# Patient Record
Sex: FEMALE
[De-identification: names, ages, dates, MRNs, and addresses within clinical notes are randomized per-mention and may not be internally consistent; named-entity substitution may affect disease eponyms.]

---

## 2018-05-02 PROBLEM — Z00.00 ENCOUNTER FOR PREVENTIVE HEALTH EXAMINATION: Status: ACTIVE | Noted: 2018-05-02

## 2018-06-01 ENCOUNTER — APPOINTMENT (OUTPATIENT)
Dept: INTERNAL MEDICINE | Facility: CLINIC | Age: 48
End: 2018-06-01

## 2018-09-10 ENCOUNTER — HOSPITAL ENCOUNTER (INPATIENT)
Dept: HOSPITAL 74 - YASAS | Age: 48
LOS: 6 days | Discharge: HOME | DRG: 773 | End: 2018-09-16
Attending: INTERNAL MEDICINE | Admitting: INTERNAL MEDICINE
Payer: COMMERCIAL

## 2018-09-10 VITALS — BODY MASS INDEX: 21.2 KG/M2

## 2018-09-10 DIAGNOSIS — I25.10: ICD-10-CM

## 2018-09-10 DIAGNOSIS — F19.24: ICD-10-CM

## 2018-09-10 DIAGNOSIS — Z86.19: ICD-10-CM

## 2018-09-10 DIAGNOSIS — I11.0: ICD-10-CM

## 2018-09-10 DIAGNOSIS — F41.9: ICD-10-CM

## 2018-09-10 DIAGNOSIS — J45.909: ICD-10-CM

## 2018-09-10 DIAGNOSIS — F12.20: ICD-10-CM

## 2018-09-10 DIAGNOSIS — F31.9: ICD-10-CM

## 2018-09-10 DIAGNOSIS — F25.0: ICD-10-CM

## 2018-09-10 DIAGNOSIS — F11.23: Primary | ICD-10-CM

## 2018-09-10 DIAGNOSIS — Z86.73: ICD-10-CM

## 2018-09-10 DIAGNOSIS — E78.00: ICD-10-CM

## 2018-09-10 DIAGNOSIS — B19.10: ICD-10-CM

## 2018-09-10 DIAGNOSIS — F17.213: ICD-10-CM

## 2018-09-10 DIAGNOSIS — Z88.8: ICD-10-CM

## 2018-09-10 PROCEDURE — HZ2ZZZZ DETOXIFICATION SERVICES FOR SUBSTANCE ABUSE TREATMENT: ICD-10-PCS | Performed by: INTERNAL MEDICINE
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
## 2018-09-11 RX ADMIN — Medication SCH MG: at 22:21

## 2018-09-11 RX ADMIN — ASPIRIN 81 MG SCH MG: 81 TABLET ORAL at 10:12

## 2018-09-11 RX ADMIN — Medication SCH TAB: at 10:13

## 2018-09-11 RX ADMIN — TOPIRAMATE SCH MG: 100 TABLET, FILM COATED ORAL at 22:20

## 2018-09-11 RX ADMIN — NICOTINE SCH MG: 14 PATCH, EXTENDED RELEASE TRANSDERMAL at 10:13

## 2018-09-11 RX ADMIN — ATORVASTATIN CALCIUM SCH MG: 40 TABLET, FILM COATED ORAL at 10:13

## 2018-09-11 RX ADMIN — ENALAPRIL MALEATE SCH MG: 5 TABLET ORAL at 10:13

## 2018-09-11 RX ADMIN — AMLODIPINE BESYLATE SCH MG: 10 TABLET ORAL at 10:15

## 2018-09-11 NOTE — CONSULT
[facility] Psychiatric Consult





- Data


Date of interview: 09/11/18


Admission source: [facility]


Identifying data: Patient is a 47 year old single female, without kids, 

unemployed, living in a shelter, and supported by [facility]. This is patient's first 

admission to detox at [facility]. Pt. admitted to  for opiate 

dependence.


Substance Abuse History: Smoking Cessation.  Smoking history: Current every day 

smoker.  Have you smoked in the past 12 months: Yes.  Aproximately how many 

cigarettes per day: 10.  Cigars Per Day: 0.  Hx Chewing Tobacco Use: No.  

Initiated information on smoking cessation: Yes.  'Breaking Loose' booklet given

: 09/10/18.  - Substance & Tx. History.  Hx Alcohol Use: No.  Hx Substance Use: 

Yes.  Substance Use Type: Heroin, Marijuana.  Hx Substance Use Treatment: Yes (

[facility] 2012).  - Substances Abused.  ** Heroin.  Route: Inhalation.  Frequency: 

Daily.  Amount used: 6 BAGS.  Age of first use: 21.  Date of Last Use: 09/10/

18.  ** Marijuana/Hashish.  Route: Smoking.  Frequency: 1-2 times per week.  

Amount used: 1 JOINT.  Age of first use: 16.  Date of Last Use: 09/09/18


Medical History: Asthma, h/o CHF, CVA (2003,2007), Hep C, ectopic pregnancy in 3

/2012


Psychiatric History: Patient reports one psychiatric hospitalization six  years 

ago at [facility] for depression. Current outpatient psychiatric services is 

provided at [address] [place] [address] in [facility]. Diagnosis of Bipolar disorder. As 

per pharmacy claims patient is prescribed latuda 120mg + topamax 100mg TID + 

Seroquel 100mg qhs + klonopin 0.5mg + clonodine 0.2BID. Pt. reports suboptimal 

adherence to medications. Reports most recently taking her medication one week 

ago. Patient denies h/o suicide attempt.


Physical/Sexual Abuse/Trauma History: denies.





Mental Status Exam





- Mental Status Exam


Alert and Oriented to: Time, Place, Person


Cognitive Function: Good


Patient Appearance: Well Groomed


Mood: Withdrawn, Euthymic


Affect: Mood Congruent


Patient Behavior: Fatigued, Cooperative


Speech Pattern: Appropriate


Voice Loudness: Moderately Soft/Quiet


Thought Process: Intact, Goal Oriented


Thought Disorder: Not Present


Hallucinations: Denies


Suicidal Ideation: Denies


Homicidal Ideation: Denies


Insight/Judgement: Poor


Sleep: Fair


Appetite: Fair


Muscle strength/Tone: Normal


Gait/Station: Normal





Psychiatric Findings





- Problem List (Axis 1, 2,3)


(1) Bipolar disorder


Current Visit: Yes   Status: Chronic   Comment: Reports sub- optimal adherence 

to medications.    





(2) Opioid dependence with withdrawal


Current Visit: Yes   Status: Acute   





(3) Cannabis dependence


Current Visit: Yes   Status: Acute   





(4) Substance induced mood disorder


Current Visit: Yes   Status: Acute   





- Initial Treatment Plan


Initial Treatment Plan: Psychoeducation provided. Detoxification in progress. 

Will order latuda 60mg qhs (reduced dosaged due to sub-optimal adherence)+ 

Topamax 100mg BID. Will hold seroquel tonight due to risk of oversedation. Pt. 

noted to be in bed throughout the day. Pt. agreeable with plan.

## 2018-09-11 NOTE — PN
[facility] COWS





- Scale


Resting Pulse: 0= [place] 80 or Below


Sweatin= Chills/Flushing


Restless Observation: 1= Difficult to Sit Still


Pupil Size: 1= Pupils >than Normal


Bone or Joint Aches: 2= Severe Diffuse Aches


Runny Nose/ Eye Tearin= Nasal Congestion


GI Upset > 30mins: 2= Nausea/Diarrhea


Tremor Observation of Outstretched Hands: 2= Slight Tremor Visible


Yawning Observation: 2= >3x During Session


Anxiety or Irritability: 2=Irritable/Anxious


Goose Flesh Skin: 0=Smooth Skin


COWS Score: 14





BHS Progress Note (SOAP)


Subjective: 





joint pain body ache muscle cramping trouble sleep at night sweat tremor anxiety


Objective: 





18 11:37


 Vital Signs











Temperature  98.1 F   18 09:31


 


Pulse Rate  69   18 09:31


 


Respiratory Rate  16   18 09:31


 


Blood Pressure  143/92   18 09:31


 


O2 Sat by Pulse Oximetry (%)      








lab no available 


Assessment: 





18 11:37


withdrawal sx


Plan: 





continue detox

## 2018-09-11 NOTE — EKG
Test Reason : 

Blood Pressure : ***/*** mmHG

Vent. Rate : 069 BPM     Atrial Rate : 069 BPM

   P-R Int : 094 ms          QRS Dur : 082 ms

    QT Int : 406 ms       P-R-T Axes : 000 050 019 degrees

   QTc Int : 435 ms

 

ECTOPIC ATRIAL RHYTHM WITH SHORT CT WITH OCCASIONAL PREMATURE

VENTRICULAR COMPLEXES

OTHERWISE NORMAL ECG

Confirmed by MD MEREDITH, WILFREDO ([id number]) on 9/11/2018 5:21:03 PM

 

Referred By:             Confirmed By:WILFREDO HARPER MD

## 2018-09-12 LAB
APPEARANCE UR: (no result)
BILIRUB UR STRIP.AUTO-MCNC: NEGATIVE MG/DL
COLOR UR: (no result)
KETONES UR QL STRIP: NEGATIVE
LEUKOCYTE ESTERASE UR QL STRIP.AUTO: NEGATIVE
NITRITE UR QL STRIP: NEGATIVE
PH UR: 7 [PH] (ref 5–8)
PROT UR QL STRIP: NEGATIVE
PROT UR QL STRIP: NEGATIVE
SP GR UR: 1.02 (ref 1–1.03)
UROBILINOGEN UR STRIP-MCNC: 2 MG/DL (ref 0.2–1)

## 2018-09-12 RX ADMIN — ENALAPRIL MALEATE SCH: 5 TABLET ORAL at 10:36

## 2018-09-12 RX ADMIN — TOPIRAMATE SCH MG: 100 TABLET, FILM COATED ORAL at 10:32

## 2018-09-12 RX ADMIN — NICOTINE POLACRILEX PRN MG: 2 GUM, CHEWING ORAL at 10:35

## 2018-09-12 RX ADMIN — Medication SCH TAB: at 10:32

## 2018-09-12 RX ADMIN — TOPIRAMATE SCH MG: 100 TABLET, FILM COATED ORAL at 13:26

## 2018-09-12 RX ADMIN — AMLODIPINE BESYLATE SCH: 10 TABLET ORAL at 10:36

## 2018-09-12 RX ADMIN — NICOTINE SCH MG: 14 PATCH, EXTENDED RELEASE TRANSDERMAL at 10:35

## 2018-09-12 RX ADMIN — ASPIRIN 81 MG SCH MG: 81 TABLET ORAL at 10:32

## 2018-09-12 RX ADMIN — ATORVASTATIN CALCIUM SCH MG: 40 TABLET, FILM COATED ORAL at 10:32

## 2018-09-12 RX ADMIN — LURASIDONE HYDROCHLORIDE SCH MG: 40 TABLET, FILM COATED ORAL at 17:30

## 2018-09-12 RX ADMIN — TOPIRAMATE SCH MG: 100 TABLET, FILM COATED ORAL at 22:31

## 2018-09-12 RX ADMIN — CLOPIDOGREL BISULFATE SCH MG: 75 TABLET, FILM COATED ORAL at 11:11

## 2018-09-12 RX ADMIN — Medication SCH MG: at 22:31

## 2018-09-12 NOTE — PN
[facility] COWS





- Scale


Resting Pulse: 0= [place] 80 or Below


Sweatin= Chills/Flushing


Restless Observation: 1= Difficult to Sit Still


Pupil Size: 1= Pupils >than Normal


Bone or Joint Aches: 2= Severe Diffuse Aches


Runny Nose/ Eye Tearin= Nasal Congestion


GI Upset > 30mins: 2= Nausea/Diarrhea


Tremor Observation of Outstretched Hands: 2= Slight Tremor Visible


Yawning Observation: 1= 1-2x During Session


Anxiety or Irritability: 2=Irritable/Anxious


Goose Flesh Skin: 0=Smooth Skin


COWS Score: 13





BHS Progress Note (SOAP)


Subjective: 





reported has panic attack on and off now since 8 am


requests klonopin that was what the er gave to her


patient had 80% breakfast ambulate on [address] social with peers and staff


body ache joints pain sweat tremor skin warm able to complete whole sentence


Objective: 





18 10:56


 Vital Signs











Temperature  97.9 F   18 09:21


 


Pulse Rate  60   18 09:21


 


Respiratory Rate  18   18 09:21


 


Blood Pressure  100/66   18 09:21


 


O2 Sat by Pulse Oximetry (%)      








lab not available


18 10:58


reorder admission lab set


Assessment: 





18 10:59


withdrawal sx


Plan: 





continue detox
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
## 2018-09-13 LAB
ALBUMIN SERPL-MCNC: 3.3 G/DL (ref 3.4–5)
ALP SERPL-CCNC: 113 U/L (ref 45–117)
ALT SERPL-CCNC: 89 U/L (ref 13–61)
ANION GAP SERPL CALC-SCNC: 9 MMOL/L (ref 8–16)
AST SERPL-CCNC: 58 U/L (ref 15–37)
BILIRUB SERPL-MCNC: 0.2 MG/DL (ref 0.2–1)
BUN SERPL-MCNC: 19 MG/DL (ref 7–18)
CALCIUM SERPL-MCNC: 8.4 MG/DL (ref 8.5–10.1)
CHLORIDE SERPL-SCNC: 113 MMOL/L (ref 98–107)
CO2 SERPL-SCNC: 22 MMOL/L (ref 21–32)
CREAT SERPL-MCNC: 0.9 MG/DL (ref 0.55–1.02)
DEPRECATED RDW RBC AUTO: 14.2 % (ref 11.6–15.6)
GLUCOSE SERPL-MCNC: 86 MG/DL (ref 74–106)
HCT VFR BLD CALC: 40.4 % (ref 32.4–45.2)
HGB BLD-MCNC: 13.4 GM/DL (ref 10.7–15.3)
MCH RBC QN AUTO: 32.1 PG (ref 25.7–33.7)
MCHC RBC AUTO-ENTMCNC: 33.1 G/DL (ref 32–36)
MCV RBC: 97.2 FL (ref 80–96)
PLATELET # BLD AUTO: 148 K/MM3 (ref 134–434)
PMV BLD: 10.2 FL (ref 7.5–11.1)
POTASSIUM SERPLBLD-SCNC: 4.4 MMOL/L (ref 3.5–5.1)
PROT SERPL-MCNC: 6.3 G/DL (ref 6.4–8.2)
RBC # BLD AUTO: 4.16 M/MM3 (ref 3.6–5.2)
SODIUM SERPL-SCNC: 144 MMOL/L (ref 136–145)
WBC # BLD AUTO: 5.2 K/MM3 (ref 4–10)

## 2018-09-13 RX ADMIN — PANTOPRAZOLE SODIUM SCH MG: 20 TABLET, DELAYED RELEASE ORAL at 17:57

## 2018-09-13 RX ADMIN — AMLODIPINE BESYLATE SCH: 10 TABLET ORAL at 11:22

## 2018-09-13 RX ADMIN — TOPIRAMATE SCH MG: 100 TABLET, FILM COATED ORAL at 22:13

## 2018-09-13 RX ADMIN — NICOTINE SCH MG: 14 PATCH, EXTENDED RELEASE TRANSDERMAL at 11:23

## 2018-09-13 RX ADMIN — LURASIDONE HYDROCHLORIDE SCH MG: 40 TABLET, FILM COATED ORAL at 17:12

## 2018-09-13 RX ADMIN — Medication SCH TAB: at 11:23

## 2018-09-13 RX ADMIN — HYDROXYZINE PAMOATE PRN MG: 50 CAPSULE ORAL at 16:45

## 2018-09-13 RX ADMIN — TOPIRAMATE SCH MG: 100 TABLET, FILM COATED ORAL at 14:16

## 2018-09-13 RX ADMIN — ENALAPRIL MALEATE SCH MG: 5 TABLET ORAL at 11:22

## 2018-09-13 RX ADMIN — Medication PRN MG: at 22:14

## 2018-09-13 RX ADMIN — TOPIRAMATE SCH MG: 100 TABLET, FILM COATED ORAL at 05:50

## 2018-09-13 RX ADMIN — CLOPIDOGREL BISULFATE SCH MG: 75 TABLET, FILM COATED ORAL at 11:22

## 2018-09-13 RX ADMIN — HYDROXYZINE PAMOATE PRN MG: 50 CAPSULE ORAL at 22:13

## 2018-09-13 RX ADMIN — Medication SCH MG: at 22:13

## 2018-09-13 RX ADMIN — ASPIRIN 81 MG SCH MG: 81 TABLET ORAL at 11:22

## 2018-09-13 RX ADMIN — ATORVASTATIN CALCIUM SCH MG: 40 TABLET, FILM COATED ORAL at 11:22

## 2018-09-13 NOTE — PN
[facility] Progress Note (SOAP)


Subjective: 





anxiety wants klonopin "I take that every day"


discuss risks of benzo mixed with opiate


body pain joints pain sweat tremor trouble sleep at night


Objective: 





09/13/18 13:59


 Vital Signs











Temperature  98.1 F   09/13/18 09:12


 


Pulse Rate  73   09/13/18 09:12


 


Respiratory Rate  18   09/13/18 09:12


 


Blood Pressure  121/65   09/13/18 09:12


 


O2 Sat by Pulse Oximetry (%)      








 Laboratory Last Values











WBC  5.2 K/mm3 (4.0-10.0)   09/13/18  07:00    


 


RBC  4.16 M/mm3 (3.60-5.2)   09/13/18  07:00    


 


Hgb  13.4 GM/dL (10.7-15.3)   09/13/18  07:00    


 


Hct  40.4 % (32.4-45.2)   09/13/18  07:00    


 


MCV  97.2 fl (80-96)  H  09/13/18  07:00    


 


MCH  32.1 pg (25.7-33.7)   09/13/18  07:00    


 


MCHC  33.1 g/dl (32.0-36.0)   09/13/18  07:00    


 


RDW  14.2 % (11.6-15.6)   09/13/18  07:00    


 


Plt Count  148 K/MM3 (134-434)   09/13/18  07:00    


 


MPV  10.2 fl (7.5-11.1)   09/13/18  07:00    


 


Sodium  144 mmol/L (136-145)   09/13/18  07:00    


 


Potassium  4.4 mmol/L (3.5-5.1)   09/13/18  07:00    


 


Chloride  113 mmol/L ()  H  09/13/18  07:00    


 


Carbon Dioxide  22 mmol/L (21-32)   09/13/18  07:00    


 


Anion Gap  9 MMOL/L (8-16)   09/13/18  07:00    


 


BUN  19 mg/dL (7-18)  H  09/13/18  07:00    


 


Creatinine  0.9 mg/dL (0.55-1.02)   09/13/18  07:00    


 


Creat Clearance w eGFR  > 60  (>60)   09/13/18  07:00    


 


Random Glucose  86 mg/dL ()   09/13/18  07:00    


 


Calcium  8.4 mg/dL (8.5-10.1)  L  09/13/18  07:00    


 


Total Bilirubin  0.2 mg/dL (0.2-1.0)   09/13/18  07:00    


 


AST  58 U/L (15-37)  H  09/13/18  07:00    


 


ALT  89 U/L (13-61)  H  09/13/18  07:00    


 


Alkaline Phosphatase  113 U/L ()   09/13/18  07:00    


 


Total Protein  6.3 g/dl (6.4-8.2)  L  09/13/18  07:00    


 


Albumin  3.3 g/dl (3.4-5.0)  L  09/13/18  07:00    


 


Urine Color  Claire   09/12/18  16:45    


 


Urine Appearance  Cloudy   09/12/18  16:45    


 


Urine pH  7.0  (5.0-8.0)   09/12/18  16:45    


 


Ur Specific Gravity  1.023  (1.001-1.035)   09/12/18  16:45    


 


Urine Protein  Negative  (NEGATIVE)   09/12/18  16:45    


 


Urine Glucose (UA)  Negative  (NEGATIVE)   09/12/18  16:45    


 


Urine Ketones  Negative  (NEGATIVE)   09/12/18  16:45    


 


Urine Blood  Negative  (NEGATIVE)   09/12/18  16:45    


 


Urine Nitrite  Negative  (NEGATIVE)   09/12/18  16:45    


 


Urine Bilirubin  Negative  (<2.0 mg/dL)   09/12/18  16:45    


 


Urine Urobilinogen  2.0 mg/dL (0.2-1.0)  H  09/12/18  16:45    


 


Ur Leukocyte Esterase  Negative  (NEGATIVE)   09/12/18  16:45    


 


RPR Titer  Nonreactive  (NONREACTIVE)   09/13/18  07:00    








lab noted


Assessment: 





09/13/18 13:59


withdrawal sx


Plan: 





continue detox

## 2018-09-14 RX ADMIN — NICOTINE POLACRILEX PRN MG: 2 GUM, CHEWING ORAL at 17:24

## 2018-09-14 RX ADMIN — NICOTINE SCH MG: 14 PATCH, EXTENDED RELEASE TRANSDERMAL at 10:36

## 2018-09-14 RX ADMIN — Medication PRN MG: at 22:41

## 2018-09-14 RX ADMIN — ASPIRIN 81 MG SCH MG: 81 TABLET ORAL at 10:34

## 2018-09-14 RX ADMIN — Medication SCH TAB: at 10:34

## 2018-09-14 RX ADMIN — AMLODIPINE BESYLATE SCH: 10 TABLET ORAL at 10:34

## 2018-09-14 RX ADMIN — TOPIRAMATE SCH MG: 100 TABLET, FILM COATED ORAL at 22:40

## 2018-09-14 RX ADMIN — ENALAPRIL MALEATE SCH: 5 TABLET ORAL at 10:34

## 2018-09-14 RX ADMIN — HYDROXYZINE PAMOATE PRN MG: 50 CAPSULE ORAL at 03:08

## 2018-09-14 RX ADMIN — Medication SCH MG: at 22:40

## 2018-09-14 RX ADMIN — TOPIRAMATE SCH MG: 100 TABLET, FILM COATED ORAL at 07:09

## 2018-09-14 RX ADMIN — PANTOPRAZOLE SODIUM SCH MG: 20 TABLET, DELAYED RELEASE ORAL at 10:34

## 2018-09-14 RX ADMIN — CLOPIDOGREL BISULFATE SCH MG: 75 TABLET, FILM COATED ORAL at 10:34

## 2018-09-14 RX ADMIN — LURASIDONE HYDROCHLORIDE SCH MG: 40 TABLET, FILM COATED ORAL at 17:18

## 2018-09-14 RX ADMIN — HYDROXYZINE PAMOATE PRN MG: 50 CAPSULE ORAL at 22:41

## 2018-09-14 RX ADMIN — TOPIRAMATE SCH MG: 100 TABLET, FILM COATED ORAL at 15:00

## 2018-09-14 RX ADMIN — ATORVASTATIN CALCIUM SCH MG: 40 TABLET, FILM COATED ORAL at 10:34

## 2018-09-14 RX ADMIN — HYDROXYZINE PAMOATE PRN MG: 50 CAPSULE ORAL at 17:32

## 2018-09-14 NOTE — PN
[facility] Progress Note (SOAP)


Subjective: 





sweats


shakes


interrupted sleep


ambulating





Objective: 





09/14/18 11:06


 Vital Signs











Temperature  98.1 F   09/14/18 10:04


 


Pulse Rate  69   09/14/18 10:04


 


Respiratory Rate  20   09/14/18 10:04


 


Blood Pressure  92/62   09/14/18 10:04


 


O2 Sat by Pulse Oximetry (%)      








aaox3


ambulating


no acute distress


Assessment: 





09/14/18 11:06


withdrawal sx


Plan: 





continue detox


increase fluids

## 2018-09-15 RX ADMIN — NICOTINE SCH MG: 14 PATCH, EXTENDED RELEASE TRANSDERMAL at 10:35

## 2018-09-15 RX ADMIN — LURASIDONE HYDROCHLORIDE SCH MG: 40 TABLET, FILM COATED ORAL at 17:23

## 2018-09-15 RX ADMIN — TOPIRAMATE SCH: 100 TABLET, FILM COATED ORAL at 07:00

## 2018-09-15 RX ADMIN — ASPIRIN 81 MG SCH MG: 81 TABLET ORAL at 10:34

## 2018-09-15 RX ADMIN — TOPIRAMATE SCH MG: 100 TABLET, FILM COATED ORAL at 13:43

## 2018-09-15 RX ADMIN — ATORVASTATIN CALCIUM SCH MG: 40 TABLET, FILM COATED ORAL at 10:35

## 2018-09-15 RX ADMIN — HYDROXYZINE PAMOATE PRN MG: 50 CAPSULE ORAL at 17:33

## 2018-09-15 RX ADMIN — ENALAPRIL MALEATE SCH: 5 TABLET ORAL at 10:35

## 2018-09-15 RX ADMIN — CLOPIDOGREL BISULFATE SCH MG: 75 TABLET, FILM COATED ORAL at 10:34

## 2018-09-15 RX ADMIN — Medication SCH MG: at 22:25

## 2018-09-15 RX ADMIN — PANTOPRAZOLE SODIUM SCH MG: 20 TABLET, DELAYED RELEASE ORAL at 10:34

## 2018-09-15 RX ADMIN — AMLODIPINE BESYLATE SCH: 10 TABLET ORAL at 10:35

## 2018-09-15 RX ADMIN — Medication SCH TAB: at 10:34

## 2018-09-15 RX ADMIN — TOPIRAMATE SCH MG: 100 TABLET, FILM COATED ORAL at 22:25

## 2018-09-15 RX ADMIN — HYDROXYZINE PAMOATE PRN MG: 50 CAPSULE ORAL at 12:05

## 2018-09-15 RX ADMIN — HYDROXYZINE PAMOATE PRN MG: 50 CAPSULE ORAL at 22:43

## 2018-09-15 NOTE — PN
[facility] Progress Note (SOAP)


Subjective: 





pt states she is feeling a lot of anxiety- was on clonidine and klonopin for 

anxiety- not getting these here. 





O:


 Vital Signs - 24 hr











  09/14/18 09/14/18 09/14/18





  14:00 18:23 22:33


 


Temperature 97.1 F L 97.5 F L 97.6 F


 


Pulse Rate 81 77 61


 


Respiratory 16 19 16





Rate   


 


Blood Pressure 129/66 101/70 105/64














  09/15/18 09/15/18 09/15/18





  00:30 07:56 08:59


 


Temperature   98.2 F


 


Pulse Rate   68


 


Respiratory 18 18 16





Rate   


 


Blood Pressure   95/66








 Laboratory Tests











  09/12/18 09/13/18 09/13/18





  16:45 07:00 07:00


 


WBC   5.2 


 


RBC   4.16 


 


Hgb   13.4 


 


Hct   40.4 


 


MCV   97.2 H 


 


MCH   32.1 


 


MCHC   33.1 


 


RDW   14.2 


 


Plt Count   148 


 


MPV   10.2 


 


Sodium    144


 


Potassium    4.4


 


Chloride    113 H


 


Carbon Dioxide    22


 


Anion Gap    9


 


BUN    19 H


 


Creatinine    0.9


 


Creat Clearance w eGFR    > 60


 


Random Glucose    86


 


Calcium    8.4 L


 


Total Bilirubin    0.2


 


AST    58 H


 


ALT    89 H


 


Alkaline Phosphatase    113


 


Total Protein    6.3 L


 


Albumin    3.3 L


 


Urine Color  Claire  


 


Urine Appearance  Cloudy  


 


Urine pH  7.0  


 


Ur Specific Gravity  1.023  


 


Urine Protein  Negative  


 


Urine Glucose (UA)  Negative  


 


Urine Ketones  Negative  


 


Urine Blood  Negative  


 


Urine Nitrite  Negative  


 


Urine Bilirubin  Negative  


 


Urine Urobilinogen  2.0 H  


 


Ur Leukocyte Esterase  Negative  


 


RPR Titer   














  09/13/18





  07:00


 


WBC 


 


RBC 


 


Hgb 


 


Hct 


 


MCV 


 


MCH 


 


MCHC 


 


RDW 


 


Plt Count 


 


MPV 


 


Sodium 


 


Potassium 


 


Chloride 


 


Carbon Dioxide 


 


Anion Gap 


 


BUN 


 


Creatinine 


 


Creat Clearance w eGFR 


 


Random Glucose 


 


Calcium 


 


Total Bilirubin 


 


AST 


 


ALT 


 


Alkaline Phosphatase 


 


Total Protein 


 


Albumin 


 


Urine Color 


 


Urine Appearance 


 


Urine pH 


 


Ur Specific Gravity 


 


Urine Protein 


 


Urine Glucose (UA) 


 


Urine Ketones 


 


Urine Blood 


 


Urine Nitrite 


 


Urine Bilirubin 


 


Urine Urobilinogen 


 


Ur Leukocyte Esterase 


 


RPR Titer  Nonreactive








mildly increased liver enzymes


decreased BP- repeat sBP ~120





a/p: continue detox protocol- about completed.


vistaril given for anxiety- pt states not helping, will give clonidine 0.1mg (

takes 0.2mg at home) BP OK


Psych consult for anxiety

## 2018-09-16 VITALS — HEART RATE: 80 BPM | TEMPERATURE: 97.7 F | SYSTOLIC BLOOD PRESSURE: 103 MMHG | DIASTOLIC BLOOD PRESSURE: 68 MMHG

## 2018-09-16 RX ADMIN — PANTOPRAZOLE SODIUM SCH MG: 20 TABLET, DELAYED RELEASE ORAL at 09:56

## 2018-09-16 RX ADMIN — Medication SCH TAB: at 09:56

## 2018-09-16 RX ADMIN — ENALAPRIL MALEATE SCH MG: 5 TABLET ORAL at 09:57

## 2018-09-16 RX ADMIN — CLOPIDOGREL BISULFATE SCH MG: 75 TABLET, FILM COATED ORAL at 09:56

## 2018-09-16 RX ADMIN — ASPIRIN 81 MG SCH MG: 81 TABLET ORAL at 09:56

## 2018-09-16 RX ADMIN — AMLODIPINE BESYLATE SCH: 10 TABLET ORAL at 09:56

## 2018-09-16 RX ADMIN — NICOTINE SCH: 14 PATCH, EXTENDED RELEASE TRANSDERMAL at 09:56

## 2018-09-16 RX ADMIN — ATORVASTATIN CALCIUM SCH MG: 40 TABLET, FILM COATED ORAL at 09:56

## 2018-09-16 RX ADMIN — TOPIRAMATE SCH MG: 100 TABLET, FILM COATED ORAL at 06:10

## 2018-09-16 NOTE — DS
[facility] Detox Discharge Summary


Admission Date: 


09/10/18





Discharge Date: 09/16/18





- History


Present History: Opioid Dependence


Additional Comments: 





47 years old female admitted on 9/10/18 for opiate withdrawal sx


completed detox regimen tolerated well denies opiate withdrawal sx


alert oriented x 3 no acute distress [facility]





- Physical Exam Results


Vital Signs: 


 Vital Signs











Temperature  96.4 F L  09/15/18 22:42


 


Pulse Rate  69   09/16/18 07:42


 


Respiratory Rate  18   09/16/18 07:42


 


Blood Pressure  96/59   09/16/18 07:42


 


O2 Sat by Pulse Oximetry (%)      











Pertinent Admission Physical Exam Findings: 





opiate withdrawal sx


 Vital Signs











Temperature  97.7 F   09/16/18 09:38


 


Pulse Rate  80   09/16/18 09:38


 


Respiratory Rate  18   09/16/18 09:38


 


Blood Pressure  103/68   09/16/18 09:38


 


O2 Sat by Pulse Oximetry (%)      








 Laboratory Last Values











WBC  5.2 K/mm3 (4.0-10.0)   09/13/18  07:00    


 


RBC  4.16 M/mm3 (3.60-5.2)   09/13/18  07:00    


 


Hgb  13.4 GM/dL (10.7-15.3)   09/13/18  07:00    


 


Hct  40.4 % (32.4-45.2)   09/13/18  07:00    


 


MCV  97.2 fl (80-96)  H  09/13/18  07:00    


 


MCH  32.1 pg (25.7-33.7)   09/13/18  07:00    


 


MCHC  33.1 g/dl (32.0-36.0)   09/13/18  07:00    


 


RDW  14.2 % (11.6-15.6)   09/13/18  07:00    


 


Plt Count  148 K/MM3 (134-434)   09/13/18  07:00    


 


MPV  10.2 fl (7.5-11.1)   09/13/18  07:00    


 


Sodium  144 mmol/L (136-145)   09/13/18  07:00    


 


Potassium  4.4 mmol/L (3.5-5.1)   09/13/18  07:00    


 


Chloride  113 mmol/L ()  H  09/13/18  07:00    


 


Carbon Dioxide  22 mmol/L (21-32)   09/13/18  07:00    


 


Anion Gap  9 MMOL/L (8-16)   09/13/18  07:00    


 


BUN  19 mg/dL (7-18)  H  09/13/18  07:00    


 


Creatinine  0.9 mg/dL (0.55-1.02)   09/13/18  07:00    


 


Creat Clearance w eGFR  > 60  (>60)   09/13/18  07:00    


 


Random Glucose  86 mg/dL ()   09/13/18  07:00    


 


Calcium  8.4 mg/dL (8.5-10.1)  L  09/13/18  07:00    


 


Total Bilirubin  0.2 mg/dL (0.2-1.0)   09/13/18  07:00    


 


AST  58 U/L (15-37)  H  09/13/18  07:00    


 


ALT  89 U/L (13-61)  H  09/13/18  07:00    


 


Alkaline Phosphatase  113 U/L ()   09/13/18  07:00    


 


Total Protein  6.3 g/dl (6.4-8.2)  L  09/13/18  07:00    


 


Albumin  3.3 g/dl (3.4-5.0)  L  09/13/18  07:00    


 


Urine Color  Claire   09/12/18  16:45    


 


Urine Appearance  Cloudy   09/12/18  16:45    


 


Urine pH  7.0  (5.0-8.0)   09/12/18  16:45    


 


Ur Specific Gravity  1.023  (1.001-1.035)   09/12/18  16:45    


 


Urine Protein  Negative  (NEGATIVE)   09/12/18  16:45    


 


Urine Glucose (UA)  Negative  (NEGATIVE)   09/12/18  16:45    


 


Urine Ketones  Negative  (NEGATIVE)   09/12/18  16:45    


 


Urine Blood  Negative  (NEGATIVE)   09/12/18  16:45    


 


Urine Nitrite  Negative  (NEGATIVE)   09/12/18  16:45    


 


Urine Bilirubin  Negative  (<2.0 mg/dL)   09/12/18  16:45    


 


Urine Urobilinogen  2.0 mg/dL (0.2-1.0)  H  09/12/18  16:45    


 


Ur Leukocyte Esterase  Negative  (NEGATIVE)   09/12/18  16:45    


 


RPR Titer  Nonreactive  (NONREACTIVE)   09/13/18  07:00    








lab noted





- Treatment


Hospital Course: Detox Protocol Followed, Detoxed Safely, Responded well, 

Discharged Condition Good, Rehab Referral Accepted


Patient has Accepted a Rehab Referral to: [facility]





- Medication


Discharge Medications: 


Ambulatory Orders





Aspirin [ASA -] 81 mg PO DAILY 09/10/18 


Bisacodyl [Bisacodyl -] 5 mg PO DAILY 09/10/18 


Docusate Sodium [Colace -] 100 mg PO DAILY 09/10/18 


Famotidine [Heartburn Prevention] 20 mg PO DAILY 09/10/18 


Hydroxyzine HCl 50 mg PO DAILY 09/10/18 


Loratadine [Claritin -] 10 mg PO DAILY 09/10/18 


Tenofovir Alafenamide Fumarate [Vemlidy] 25 mg PO DAILY 09/10/18 


Lurasidone HCl [Latuda] 60 mg PO HS 09/11/18 


Topiramate [Topamax -] 100 mg PO BID 09/11/18 


Quetiapine Fumarate [Seroquel] 50 mg PO HS 09/12/18 


Amlodipine Besylate [Norvasc -] 10 mg PO DAILY #30 tablet 09/16/18 


Atorvastatin Ca [Lipitor] 40 mg PO DAILY #30 tablet 09/16/18 


Clopidogrel Bisulfate [Plavix -] 75 mg PO DAILY #30 tablet 09/16/18 


Enalapril Maleate [Vasotec -] 5 mg PO DAILY #30 tablet 09/16/18 


Gabapentin [Neurontin -] 800 mg PO DAILY #30 capsule 09/16/18 


Metoprolol Succinate [Toprol XL -] 25 mg PO DAILY #30 tab.sr.24h 09/16/18 











- Diagnosis


(1) Opioid dependence with withdrawal


Status: Acute   





(2) Asthma


Status: Chronic   


Qualifiers: 


   Asthma severity: mild   Asthma persistence: intermittent   Asthma 

complication type: with status asthmaticus   Qualified Code(s): J45.22 - Mild 

intermittent asthma with status asthmaticus   





(3) HTN (hypertension)


Status: Chronic   


Qualifiers: 


   Hypertension type: essential hypertension   Qualified Code(s): I10 - 

Essential (primary) hypertension   





(4) Heart failure


Status: Chronic   


Qualifiers: 


   Heart failure type: unspecified   Heart failure chronicity: unspecified   

Qualified Code(s): I50.9 - Heart failure, unspecified   





(5) Hep B w/o coma


Status: Chronic   





(6) Hyperlipidemia


Status: Chronic   


Qualifiers: 


   Hyperlipidemia type: pure hypercholesterolemia   Qualified Code(s): E78.00 - 

Pure hypercholesterolemia, unspecified; E78.0 - Pure hypercholesterolemia   





(7) Nicotine dependence


Status: Acute   


Qualifiers: 


   Nicotine product type: cigarettes   Substance use status: in withdrawal   

Qualified Code(s): F17.213 - Nicotine dependence, cigarettes, with withdrawal   





(8) Schizoaffective disorder


Status: Suspected   


Qualifiers: 


   Schizoaffective disorder type: bipolar   Qualified Code(s): F25.0 - 

Schizoaffective disorder, bipolar type   





- AMA


Did Patient Leave Against Medical Advice: No
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
## 2019-02-12 NOTE — PN
[facility] CIWA





- CIWA Score


Nausea/Vomitin-Mild Nausea/No Vomiting


Muscle Tremors: 3


Anxiety: 3


Agitation: 3


Paroxysmal Sweats: 1-Minimal Palms Moist


Orientation: 1-Uncertain about Date


Tacttile Disturbances: 1-Very Mild Itch/Numbness


Auditory Disturbances: 0-None


Visual Disturbances: 0-None


Headache: 1-Very Mild


CIWA-Ar Total Score: 14





[facility] COWS





- Scale


Resting Pulse: 0= MD 80 or Below


Sweatin= Chills/Flushing


Restless Observation: 1= Difficult to Sit Still


Pupil Size: 0= Normal to Room Light


Bone or Joint Aches: 2= Severe Diffuse Aches


Runny Nose/ Eye Tearin= Nasal Congestion


GI Upset > 30mins: 1= Stomach Cramp


Tremor Observation of Outstretched Hands: 1= Tremor Felt, Not Seen


Yawning Observation: 1= 1-2x During Session


Anxiety or Irritability: 2=Irritable/Anxious


Goose Flesh Skin: 0=Smooth Skin


COWS Score: 10





BHS Progress Note (SOAP)


Subjective: 





body aches tremor anxiety restlessness


 die x 3 years sober on and off throughout the years


tearful determines to care for self and maintain sober





Objective: 





19 11:55


 Vital Signs











Temperature  96.7 F L  19 09:13


 


Pulse Rate  68   19 09:13


 


Respiratory Rate  16   19 09:13


 


Blood Pressure  103/72   19 09:13


 


O2 Sat by Pulse Oximetry (%)      








 Laboratory Last Values











WBC  9.0 K/mm3 (4.0-10.0)   19  08:10    


 


RBC  4.40 M/mm3 (3.60-5.2)   19  08:10    


 


Hgb  14.6 GM/dL (10.7-15.3)   19  08:10    


 


Hct  42.2 % (32.4-45.2)   19  08:10    


 


MCV  96.0 fl (80-96)   19  08:10    


 


MCH  33.2 pg (25.7-33.7)   19  08:10    


 


MCHC  34.6 g/dl (32.0-36.0)   19  08:10    


 


RDW  15.2 % (11.6-15.6)   19  08:10    


 


Plt Count  187 K/MM3 (134-434)  D 19  08:10    


 


MPV  9.5 fl (7.5-11.1)   19  08:10    


 


Sodium  142 mmol/L (136-145)   19  08:10    


 


Potassium  3.5 mmol/L (3.5-5.1)   19  08:10    


 


Chloride  106 mmol/L ()   19  08:10    


 


Carbon Dioxide  28 mmol/L (21-32)   19  08:10    


 


Anion Gap  7 MMOL/L (8-16)  L  19  08:10    


 


BUN  9 mg/dL (7-18)   19  08:10    


 


Creatinine  0.8 mg/dL (0.55-1.3)   19  08:10    


 


Creat Clearance w eGFR  > 60  (>60)   19  08:10    


 


Random Glucose  101 mg/dL ()   19  08:10    


 


Calcium  8.5 mg/dL (8.5-10.1)   19  08:10    


 


Total Bilirubin  0.6 mg/dL (0.2-1)   19  08:10    


 


AST  46 U/L (15-37)  H  19  08:10    


 


ALT  54 U/L (13-61)   19  08:10    


 


Alkaline Phosphatase  114 U/L ()   19  08:10    


 


Total Protein  6.8 g/dl (6.4-8.2)   19  08:10    


 


Albumin  3.7 g/dl (3.4-5.0)   19  08:10    


 


Urine Color  Claire   19  22:15    


 


Urine Appearance  Clear   19  22:15    


 


Urine pH  6.0  (5.0-8.0)   19  22:15    


 


Ur Specific Gravity  1.023  (1.010-1.035)   19  22:15    


 


Urine Protein  Negative  (NEGATIVE)   19  22:15    


 


Urine Glucose (UA)  Negative  (NEGATIVE)   19  22:15    


 


Urine Ketones  Negative  (NEGATIVE)   19  22:15    


 


Urine Blood  Negative  (NEGATIVE)   19  22:15    


 


Urine Nitrite  Negative  (NEGATIVE)   19  22:15    


 


Urine Bilirubin  Negative  (<2.0 mg/dL)   19  22:15    


 


Urine Urobilinogen  4.0 e.u/dl mg/dL (0.2-1.0)  H  19  22:15    


 


Ur Leukocyte Esterase  Negative  (NEGATIVE)   19  22:15    








lab noted


increase enalapril to 5 mg bid


Assessment: 





19 11:56


withdrawal sx


denies suicidal ideation that  suicided 


patient is angry but good self control verbalizes feelings and coping machines


Plan: 





continue detox

## 2019-02-12 NOTE — PN
[facility] Progress Note


Note: 





Psychiatry


Attending's note (delayed) : 


Called by nurse.


Issue : patient requests seroquel at bedtime. 


Chart reviewed. Normal vitals. No sedation reported.


Patient is ambulatory all day.Steady gait. Anxious.


Intervention : seroquel 100 mg po hs. Ordered.


Will follow and titrate as clinically indicated.

## 2019-02-13 NOTE — PN
[facility] Progress Note


Note: 





Received Notice from RN that Patient Reports that Immdoium has not been 

effective for treatment of Diarrhea. Will change to Lomotil PO PRN for 

treatment of diarrhea going forward. WILLIAM Johnson NP

## 2019-02-13 NOTE — PN
S CIWA





- CIWA Score


Nausea/Vomiting: 3


Muscle Tremors: 2


Anxiety: 4-Mod. Anxious/Guarded


Agitation: 3


Paroxysmal Sweats: 2


Orientation: 2-Disoriented Date<2 days


Tacttile Disturbances: 1-Very Mild Itch/Numbness


Auditory Disturbances: 0-None


Visual Disturbances: 1-Very Mild Sensitivity


Headache: 0-None Present


CIWA-Ar Total Score: 18





[facility] COWS





- Scale


Resting Pulse: 1= [place] 


Sweatin= Chills/Flushing


Restless Observation: 1= Difficult to Sit Still


Pupil Size: 0= Normal to Room Light


Bone or Joint Aches: 2= Severe Diffuse Aches


Runny Nose/ Eye Tearin= None


GI Upset > 30mins: 2= Nausea/Diarrhea


Tremor Observation of Outstretched Hands: 2= Slight Tremor Visible


Yawning Observation: 1= 1-2x During Session


Anxiety or Irritability: 4=Extreme Anxiety


Goose Flesh Skin: 0=Smooth Skin


COWS Score: 14





BHS Progress Note (SOAP)


Subjective: 





Interrupted Sleep, H/A, Nausea, Tremors, Sweating, Diarrhea, Body Aches, Hot / 

Cold Sensations.


Objective: 


PATIENT A & O X 2 (UNCERTAIN ABOUT CURRENT DAY / DATE). PATIENT OBSERVED 

AMBULATING ON UNIT. IN NO ACUTE DISTRESS.





19 14:43


 Vital Signs











Temperature  98.3 F   19 13:36


 


Pulse Rate  82   19 13:36


 


Respiratory Rate  18   19 13:36


 


Blood Pressure  147/110 H  19 13:36


 


O2 Sat by Pulse Oximetry (%)      








 Laboratory Tests











  19





  22:15 08:10 08:10


 


WBC   9.0 


 


RBC   4.40 


 


Hgb   14.6 


 


Hct   42.2 


 


MCV   96.0 


 


MCH   33.2 


 


MCHC   34.6 


 


RDW   15.2 


 


Plt Count   187  D 


 


MPV   9.5 


 


Sodium    142


 


Potassium    3.5


 


Chloride    106


 


Carbon Dioxide    28


 


Anion Gap    7 L


 


BUN    9


 


Creatinine    0.8


 


Creat Clearance w eGFR    > 60


 


Random Glucose    101


 


Calcium    8.5


 


Total Bilirubin    0.6


 


AST    46 H


 


ALT    54


 


Alkaline Phosphatase    114


 


Total Protein    6.8


 


Albumin    3.7


 


Urine Color  Claire  


 


Urine Appearance  Clear  


 


Urine pH  6.0  


 


Ur Specific Gravity  1.023  


 


Urine Protein  Negative  


 


Urine Glucose (UA)  Negative  


 


Urine Ketones  Negative  


 


Urine Blood  Negative  


 


Urine Nitrite  Negative  


 


Urine Bilirubin  Negative  


 


Urine Urobilinogen  4.0 e.u/dl H  


 


Ur Leukocyte Esterase  Negative  


 


RPR Titer   














  19





  08:10


 


WBC 


 


RBC 


 


Hgb 


 


Hct 


 


MCV 


 


MCH 


 


MCHC 


 


RDW 


 


Plt Count 


 


MPV 


 


Sodium 


 


Potassium 


 


Chloride 


 


Carbon Dioxide 


 


Anion Gap 


 


BUN 


 


Creatinine 


 


Creat Clearance w eGFR 


 


Random Glucose 


 


Calcium 


 


Total Bilirubin 


 


AST 


 


ALT 


 


Alkaline Phosphatase 


 


Total Protein 


 


Albumin 


 


Urine Color 


 


Urine Appearance 


 


Urine pH 


 


Ur Specific Gravity 


 


Urine Protein 


 


Urine Glucose (UA) 


 


Urine Ketones 


 


Urine Blood 


 


Urine Nitrite 


 


Urine Bilirubin 


 


Urine Urobilinogen 


 


Ur Leukocyte Esterase 


 


RPR Titer  Nonreactive








LABS NOTED.


Assessment: 





19 14:46


WITHDRAWAL SYMPTOMS.


HYPERTENSION.


Plan: 





CONTINUE DETOX.


PRN IMMODIUM PO FOR DIARRHEA.


INCREASE METOPROLOL (TOPROL XL) TO 50 MG PO DAILY FOR ELEVATED BP DESPITE 

TREATMENT.


ZOFRAN SL FOR NAUSEA.


PROTONIX PO FOR ACID REFLUX (PATIENT REPORTS HISTORY OF TAKING PRILOSEC FOR 

ACID REFLUX AS OUTPATIENT).

## 2019-02-14 NOTE — PN
B[facility] Progress Note (SOAP)


Subjective: 





alert,irritable,anxious,interrupted sleep


Objective: 





02/14/19 09:22


 Vital Signs











Temperature  97.8 F   02/14/19 09:01


 


Pulse Rate  63   02/14/19 09:01


 


Respiratory Rate  18   02/14/19 09:01


 


Blood Pressure  104/51 L  02/14/19 09:01


 


O2 Sat by Pulse Oximetry (%)      











Assessment: 





02/14/19 09:22


withdrawal symptom


Plan: 





continue detox

## 2019-02-15 NOTE — PN
[facility] Progress Note


Note: 





diarrhea,stool for c diff pending,dietician consultation,will start on flagyl 

500 mgs po tid for 7 days

## 2019-02-15 NOTE — PN
[facility] Progress Note (SOAP)


Subjective: 





alert,diarrhea,no vomiting,on lomotil,stool for c diff pending,will d/c ensure ,

did not want to be on danielito diet,


ambulation,anxious


Objective: 





02/15/19 09:53


 Vital Signs











Temperature  97.8 F   02/15/19 09:14


 


Pulse Rate  69   02/15/19 09:14


 


Respiratory Rate  18   02/15/19 09:14


 


Blood Pressure  102/60   02/15/19 09:14


 


O2 Sat by Pulse Oximetry (%)      











Assessment: 





02/15/19 09:54


withdrawal symptom


Plan: 





continue detox,d/c ensure,encourage oral fluid,repeat cbc,cmp in am

## 2019-02-15 NOTE — PN
Psychiatric Progress Note


Vital Signs: 


 Vital Signs











 Period  Temp  Pulse  Resp  BP Sys/Hernandez  Pulse Ox


 


 Last 24 Hr  97.0 F-100.2 F  69-76  16-18  /53-87  











Date of Session: 02/15/19


Chief Complaint:: " I get anxious and i lash out."


Current Medications: 


Active Medications











Generic Name Dose Route Start Last Admin





  Trade Name Freq  PRN Reason Stop Dose Admin


 


Acetaminophen  650 mg  02/11/19 13:59  02/12/19 06:10





  Tylenol -  PO   650 mg





  Q4H PRN   Administration





  FEVER   





     





     





     


 


Al Hydroxide/Mg Hydroxide  30 ml  02/11/19 13:59  02/11/19 18:12





  Mylanta Oral Suspension -  PO   30 ml





  Q6H PRN   Administration





  DYSPEPSIA   





     





     





     


 


Albuterol Sulfate  2 puff  02/11/19 14:04  02/12/19 06:09





  Ventolin Hfa Inhaler -  IH   2 puff





  Q4H PRN   Administration





  SHORT OF BREATH/WHEEZING   





     





     





     


 


Albuterol/Ipratropium  1 amp  02/11/19 14:09  02/12/19 06:14





  Duoneb -  NEB   1 amp





  Q6H PRN   Administration





  SHORTNESS OF BREATH   





     





     





     


 


Aspirin  81 mg  02/12/19 10:00  02/14/19 10:56





  Asa -  PO   81 mg





  DAILY NIDIA   Administration





     





     





     





     


 


Atorvastatin Calcium  40 mg  02/12/19 10:00  02/14/19 10:56





  Lipitor -  PO   40 mg





  DAILY NIDIA   Administration





     





     





     





     


 


Chlordiazepoxide HCl  10 mg  02/14/19 17:00  02/15/19 05:27





  Librium -  PO  02/15/19 11:01  10 mg





  [id number] NIDIA   Administration





     





     





     





     


 


Cyclobenzaprine HCl  5 mg  02/11/19 14:57  02/14/19 10:56





  Cyclobenzaprine Hcl  PO   5 mg





  TID PRN   Administration





  BACK PAIN   





     





     





     


 


Diphenoxylate HCl/Atropine  1 combo  02/13/19 16:34  02/15/19 09:17





  Lomotil -  PO  02/16/19 16:33  1 combo





  Q8H PRN   Administration





  DIARRHEA   





     





     





     


 


Enalapril Maleate  5 mg  02/12/19 10:00  02/14/19 23:09





  Vasotec -  PO   5 mg





  BID NIDIA   Administration





     





     





     





     


 


Eucalyptus/Menthol/Phenol/Sorbitol  1 each  02/11/19 13:59  02/12/19 06:10





  Cepastat Lozenge -  MM   1 each





  Q4H PRN   Administration





  SORE THROAT   





     





     





     


 


Guaifenesin  10 ml  02/11/19 13:59  





  Robitussin Dm -  PO   





  Q6H PRN   





  COUGH   





     





     





     


 


Ibuprofen  400 mg  02/11/19 13:59  





  Motrin -  PO   





  Q6H PRN   





  PAIN LEVEL 4-6   





     





     





     


 


Loratadine  10 mg  02/12/19 10:00  02/14/19 10:58





  Claritin -  PO   10 mg





  DAILY NIDIA   Administration





     





     





     





     


 


Magnesium Citrate  300 ml  02/11/19 13:59  





  Citroma -  PO   





  Q48H PRN   





  CONSTIPATION   





     





     





     


 


Magnesium Hydroxide  30 ml  02/11/19 13:59  





  Milk Of Magnesia -  PO   





  DAILY PRN   





  CONSTIPATION   





     





     





     


 


Melatonin  5 mg  02/11/19 22:00  02/13/19 22:19





  Melatonin  PO   5 mg





  HS PRN   Administration





  INSOMNIA   





     





     





     


 


Methadone HCl  5 mg  02/16/19 06:00  





  Dolophine -  PO  02/16/19 06:01  





  DAILY@0600 NIDIA   





     





     





     





     


 


Methadone HCl  10 mg  02/15/19 10:00  





  Dolophine -  PO  02/15/19 10:01  





  DAILY [facility]   





     





     





     





     


 


Metoprolol Succinate  50 mg  02/14/19 10:00  02/14/19 10:56





  Toprol Xl -  PO   50 mg





  DAILY NIDIA   Administration





     





     





     





     


 


Nicotine  14 mg  02/12/19 10:00  02/14/19 10:57





  Nicoderm Patch -  TD   14 mg





  DAILY NIDIA   Administration





     





     





     





     


 


Nicotine Polacrilex  2 mg  02/11/19 13:59  





  Nicorette Gum -  BUC   





  Q2H PRN   





  NICOTINE REPLACEMENT RX   





     





     





     


 


Ondansetron HCl  4 mg  02/13/19 10:53  02/15/19 09:02





  Zofran Odt -  SL   4 mg





  Q8H PRN   Administration





  NAUSEA AND/OR VOMITING   





     





     





     


 


Pantoprazole Sodium  40 mg  02/14/19 06:00  02/15/19 05:27





  Protonix -  PO   40 mg





  DAILY@0600 NIDIA   Administration





     





     





     





     


 


Prenatal Multivit/Folic Acid/Iron  1 tab  02/12/19 10:00  02/14/19 10:56





  Prenatal Vitamins (Sjr) -  PO   1 tab





  DAILY NIDIA   Administration





     





     





     





     


 


Pseudoephedrine/Triprolidine  1 combo  02/11/19 13:59  02/12/19 06:10





  Actifed -  PO   1 combo





  TID PRN   Administration





  NASAL CONGESTION   





     





     





     


 


Quetiapine Fumarate  100 mg  02/12/19 22:00  02/14/19 23:09





  Seroquel -  PO   100 mg





  HS NIDIA   Administration





     





     





     





     


 


Quetiapine Fumarate  25 mg  02/15/19 09:39  





  Seroquel -  PO  02/15/19 09:40  





  ONCE ONE   





     





     





     





     


 


Thiamine HCl  100 mg  02/11/19 22:00  02/14/19 23:13





  Vitamin B1 -  PO   Not Given





  HS [facility]   





     





     





     





     











Provider note:: Dr. Lomeli note read and appreciated. Patient irritable this 

moring. Observed yelling at nursing staff and having difficulty following 

directions. Patient reports worsening anxiety and irritability this morning 

because of her withdrawals. Medications reviewed. Will order one time dose of 

seroquel 25mg and vistaril 25mg q4h for agitation. Benefits and side effects 

discussed. Verbal consent given.


Total face to face time:: 25





Mental Status Exam





- Mental Status Exam


Alert and Oriented to: Time, Place, Person


Cognitive Function: Good


Patient Appearance: Well Groomed


Mood: Irritable


Affect: Mood Congruent


Patient Behavior: Agitated


Speech Pattern: Clear


Voice Loudness: Normal


Thought Process: Intact, Goal Oriented


Thought Disorder: Not Present


Hallucinations: Denies


Suicidal Ideation: Denies


Homicidal Ideation: Denies


Insight/Judgement: Poor


Sleep: Fair


Appetite: Fair


Muscle strength/Tone: Normal


Gait/Station: Normal





Psychiatric Treatment Plan





- Problem List


(1) Alcohol dependence with withdrawal


Current Visit: Yes   





(2) Opioid dependence with withdrawal


Current Visit: Yes   





(3) Cannabis dependence


Current Visit: Yes   





(4) Nicotine dependence


Current Visit: Yes   


Qualifiers: 


   Nicotine product type: cigarettes   Substance use status: in withdrawal   

Qualified Code(s): F17.213 - Nicotine dependence, cigarettes, with withdrawal   





(5) Schizoaffective disorder


Current Visit: Yes   


Qualifiers: 


   Schizoaffective disorder type: bipolar   Qualified Code(s): F25.0 - 

Schizoaffective disorder, bipolar type   





(6) Substance induced mood disorder


Current Visit: Yes

## 2019-02-15 NOTE — PN
BHS Progress Note


Note: 





seen by dietician,will also send the stool for ova and parasite,close monitoring

## 2019-02-16 NOTE — PN
BHS Progress Note (SOAP)


Subjective: 





alert,no complaint,no diarrhea,no vomiting,tolerate diet well,no complaint


Objective: 





02/16/19 09:20


bp 106/56,p64,r16,t98


Assessment: 





02/16/19 09:21


patient felt much better,no diarrhea,tolerate diet well,did not want to wait 

for labs test today and result of stool test,did not want to give specimen for 

ova and parasite


Plan: 





discharge in good condition,prescription to [facility] pharmacy,follow up with 

rehab in [place]

## 2019-02-16 NOTE — DS
BHS Detox Discharge Summary


Admission Date: 


02/11/19





Discharge Date: 02/16/19





- History


Present History: Alcohol Dependence, Opioid Dependence


Additional Comments: 





patient is stable for discharge today,stool for c difficile is negative,follow 

up with rehab [place],prescription 


to [facility]


Pertinent Past History: 





asthma


hypertension


cad


hyperlipidemioa








- Physical Exam Results


Vital Signs: 


 Vital Signs











Temperature  97.9 F   02/16/19 08:38


 


Pulse Rate  59 L  02/16/19 08:38


 


Respiratory Rate  16   02/16/19 08:38


 


Blood Pressure  88/54 L  02/16/19 08:38


 


O2 Sat by Pulse Oximetry (%)      














- Treatment


Hospital Course: Detox Protocol Followed, Detoxed Safely, Responded well, 

Discharged Condition Good, Rehab Referral Accepted


Patient has Accepted a Rehab Referral to: [facility]





- Medication


Discharge Medications: 


Ambulatory Orders





Loratadine [Claritin -] 10 mg PO DAILY 09/10/18 


Tenofovir Alafenamide Fumarate [Vemlidy] 25 mg PO DAILY 09/10/18 


Quetiapine Fumarate [Seroquel] 100 mg PO BID 09/12/18 


Enalapril Maleate [Vasotec -] 5 mg PO DAILY #30 tablet 09/16/18 


Gabapentin [Neurontin -] 800 mg PO DAILY #30 capsule 09/16/18 


Metoprolol Succinate [Toprol XL -] 25 mg PO DAILY #30 tab.sr.24h 09/16/18 


Ranitidine [Zantac -] 150 mg PO DAILY 02/11/19 


Aspirin [ASA -] 81 mg PO DAILY #30 tab.chew 02/16/19 


Atorvastatin Ca [Lipitor] 40 mg PO DAILY #30 tablet 02/16/19 


Pantoprazole Sodium [Protonix -] 40 mg PO DAILY@0600 #10 tablet.ec 02/16/19 


metroNIDAZOLE [Flagyl -] 500 mg PO TID #20 tablet 02/16/19 











- Diagnosis


(1) Alcohol dependence with withdrawal


Current Visit: Yes   Status: Acute   





(2) Opioid dependence with withdrawal


Current Visit: Yes   Status: Acute   





(3) Asthma


Current Visit: Yes   Status: Chronic   


Qualifiers: 


   Asthma severity: mild   Asthma persistence: intermittent   Asthma 

complication type: with status asthmaticus   Qualified Code(s): J45.22 - Mild 

intermittent asthma with status asthmaticus   





(4) CAD (coronary artery disease)


Current Visit: Yes   Status: Chronic   





(5) Cannabis dependence


Current Visit: Yes   Status: Chronic   





(6) HTN (hypertension)


Current Visit: Yes   Status: Chronic   


Qualifiers: 


   Hypertension type: essential hypertension   Qualified Code(s): I10 - 

Essential (primary) hypertension   





(7) Hyperlipidemia


Current Visit: Yes   Status: Chronic   


Qualifiers: 


   Hyperlipidemia type: pure hypercholesterolemia   Qualified Code(s): E78.00 - 

Pure hypercholesterolemia, unspecified; E78.0 - Pure hypercholesterolemia   





(8) Nicotine dependence


Current Visit: Yes   Status: Chronic   


Qualifiers: 


   Nicotine product type: cigarettes   Substance use status: in withdrawal   

Qualified Code(s): F17.213 - Nicotine dependence, cigarettes, with withdrawal   





(9) Schizoaffective disorder


Current Visit: Yes   Status: Chronic   


Qualifiers: 


   Schizoaffective disorder type: bipolar   Qualified Code(s): F25.0 - 

Schizoaffective disorder, bipolar type   





(10) Diarrhea


Current Visit: Yes   Status: Acute   





- AMA


Did Patient Leave Against Medical Advice: No
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
## 2019-08-21 NOTE — PN
[facility]HS Progress Note


Note: 





patient is heroin dependence,alcohol dependence with ccoaine abused,need 

inpatient detox,medicaloly managed,methadone and librium regimen

## 2019-08-21 NOTE — EKG
Test Reason : 

Blood Pressure : ***/*** mmHG

Vent. Rate : 057 BPM     Atrial Rate : 057 BPM

   P-R Int : 120 ms          QRS Dur : 088 ms

    QT Int : 434 ms       P-R-T Axes : 045 060 016 degrees

   QTc Int : 422 ms

 

SINUS BRADYCARDIA

OTHERWISE NORMAL ECG

WHEN COMPARED WITH ECG OF 11-SEP-2018 01:16,

PREMATURE VENTRICULAR COMPLEXES ARE NO LONGER PRESENT

Confirmed by SUNI ROBLES MD ([id number]) on 8/21/2019 1:40:45 PM

 

Referred By:             Confirmed By:SUNI ROBLES MD

## 2019-08-22 NOTE — PN
S CIWA





- CIWA Score


Nausea/Vomitin-Mild Nausea/No Vomiting


Muscle Tremors: 3


Anxiety: 3


Agitation: 3


Paroxysmal Sweats: 2


Orientation: 0-Oriented


Tacttile Disturbances: 0-None


Auditory Disturbances: 0-None


Visual Disturbances: 0-None


Headache: 2-Mild


CIWA-Ar Total Score: 14





BHS COWS





- Scale


Resting Pulse: 0= [place] 80 or Below


Sweatin= Chills/Flushing


Restless Observation: 0= Sits Still


Pupil Size: 0= Normal to Room Light


Bone or Joint Aches: 1= Mild Discomfort


Runny Nose/ Eye Tearin= Nasal Congestion


GI Upset > 30mins: 2= Nausea/Diarrhea


Tremor Observation of Outstretched Hands: 2= Slight Tremor Visible


Yawning Observation: 2= >3x During Session


Anxiety or Irritability: 2=Irritable/Anxious


Goose Flesh Skin: 3=Piloerection


COWS Score: 14





[facility] Progress Note (SOAP)


Subjective: 


48 years old female admitted on 19 for acute alcohol and opiate 

withdrawal sx management


doing well with librium and methadone detox regimen


 had mild verbal altercation with roommate today


counselor and writer meet with the patient discuss self control and gal 

oriented detox stay


patient regain self control able to contract for safety of self and others


Objective: 





19 11:35


 Vital Signs











Temperature  98.1 F   19 09:24


 


Pulse Rate  62   19 09:24


 


Respiratory Rate  18   19 09:24


 


Blood Pressure  146/87   19 09:24


 


O2 Sat by Pulse Oximetry (%)      








 Laboratory Last Values











WBC  7.2 K/mm3 (4.0-10.0)   19  12:15    


 


RBC  4.42 M/mm3 (3.60-5.2)   19  12:15    


 


Hgb  14.4 GM/dL (10.7-15.3)   19  12:15    


 


Hct  43.5 % (32.4-45.2)   19  12:15    


 


MCV  98.3 fl (80-96)  H  19  12:15    


 


MCH  32.6 pg (25.7-33.7)   19  12:15    


 


MCHC  33.2 g/dl (32.0-36.0)   19  12:15    


 


RDW  16.1 % (11.6-15.6)  H  19  12:15    


 


Plt Count  146 K/MM3 (134-434)   19  12:15    


 


MPV  10.1 fl (7.5-11.1)   19  12:15    


 


Sodium  142 mmol/L (136-145)   19  12:15    


 


Potassium  3.6 mmol/L (3.5-5.1)   19  12:15    


 


Chloride  108 mmol/L ()  H  19  12:15    


 


Carbon Dioxide  29 mmol/L (21-32)   19  12:15    


 


Anion Gap  5 MMOL/L (8-16)  L  19  12:15    


 


BUN  16.7 mg/dL (7-18)   19  12:15    


 


Creatinine  0.7 mg/dL (0.55-1.3)   19  12:15    


 


Est GFR (CKD-EPI)AfAm  118.74   19  12:15    


 


Est GFR (CKD-EPI)NonAf  102.45   19  12:15    


 


Random Glucose  89 mg/dL ()   19  12:15    


 


Calcium  9.0 mg/dL (8.5-10.1)   19  12:15    


 


Total Bilirubin  0.9 mg/dL (0.2-1)   19  12:15    


 


AST  99 U/L (15-37)  H  19  12:15    


 


ALT  108 U/L (13-61)  H  19  12:15    


 


Alkaline Phosphatase  128 U/L ()  H  19  12:15    


 


Total Protein  7.3 g/dl (6.4-8.2)   19  12:15    


 


Albumin  4.1 g/dl (3.4-5.0)   19  12:15    


 


RPR Titer  Nonreactive  (NONREACTIVE)   19  12:15    








lab noted


Assessment: 





19 11:35


alcohol and opiate withdrawal sx


19 11:36


alert ambulating on hallway 


trouble sleep at night appears restlessness 


follow verbal requesting able to calm down 


Plan: 





continue librium and methadone detox regimen

## 2019-08-23 NOTE — PN
S CIWA





- CIWA Score


Nausea/Vomitin-No Nausea/No Vomiting


Muscle Tremors: 2


Anxiety: 4-Mod. Anxious/Guarded


Agitation: 4-Moderately Restless


Paroxysmal Sweats: 2


Orientation: 0-Oriented


Tacttile Disturbances: 0-None


Auditory Disturbances: 0-None


Visual Disturbances: 1-Very Mild Sensitivity


Headache: 0-None Present


CIWA-Ar Total Score: 13





BHS COWS





- Scale


Resting Pulse: 0= VT 80 or Below


Sweatin= Chills/Flushing


Restless Observation: 1= Difficult to Sit Still


Pupil Size: 0= Normal to Room Light


Bone or Joint Aches: 4=Acute Joint/Muscle Pain


Runny Nose/ Eye Tearin= None


GI Upset > 30mins: 0= None


Tremor Observation of Outstretched Hands: 0= None


Yawning Observation: 1= 1-2x During Session


Anxiety or Irritability: 4=Extreme Anxiety


Goose Flesh Skin: 3=Piloerection


COWS Score: 14





BHS Progress Note (SOAP)


Subjective: 





Body Aches, Anxious, Restless, Interrupted Sleep.


Objective: 


PATIENT PATIENT A & O X 3, OBSERVED AMBULATING ON UNIT UNASSISTED. IN NO ACUTE 

DISTRESS.





19 16:54


 Laboratory Tests











  19





  12:15 12:15 12:15


 


WBC  7.2  


 


RBC  4.42  


 


Hgb  14.4  


 


Hct  43.5  


 


MCV  98.3 H  


 


MCH  32.6  


 


MCHC  33.2  


 


RDW  16.1 H  


 


Plt Count  146  


 


MPV  10.1  


 


Sodium   142 


 


Potassium   3.6 


 


Chloride   108 H 


 


Carbon Dioxide   29 


 


Anion Gap   5 L 


 


BUN   16.7 


 


Creatinine   0.7 


 


Est GFR (CKD-EPI)AfAm   118.74 


 


Est GFR (CKD-EPI)NonAf   102.45 


 


Random Glucose   89 


 


Calcium   9.0 


 


Total Bilirubin   0.9 


 


AST   99 H 


 


ALT   108 H 


 


Alkaline Phosphatase   128 H 


 


Total Protein   7.3 


 


Albumin   4.1 


 


POC Urine HCG, Qual   


 


RPR Titer    Nonreactive














  19





  12:17


 


WBC 


 


RBC 


 


Hgb 


 


Hct 


 


MCV 


 


MCH 


 


MCHC 


 


RDW 


 


Plt Count 


 


MPV 


 


Sodium 


 


Potassium 


 


Chloride 


 


Carbon Dioxide 


 


Anion Gap 


 


BUN 


 


Creatinine 


 


Est GFR (CKD-EPI)AfAm 


 


Est GFR (CKD-EPI)NonAf 


 


Random Glucose 


 


Calcium 


 


Total Bilirubin 


 


AST 


 


ALT 


 


Alkaline Phosphatase 


 


Total Protein 


 


Albumin 


 


POC Urine HCG, Qual  Negative


 


RPR Titer 














LABS NOTED.


PATIENT HAS HAD ELEVATED LIVER ENZYME LEVELS ON PREVIOUS ADMISSIONS.





19 16:56


Assessment: 





19 16:54


WITHDRAWAL SYMPTOMS.


ELEVATED LIVER ENZYMES.





19 16:56


Plan: 





CONTINUE DETOX.


LIDODERM PATCH FOR LOWER BACK PAIN.

## 2019-08-24 NOTE — PN
[facility] CIWA





- CIWA Score


Nausea/Vomitin-No Nausea/No Vomiting


Muscle Tremors: None


Anxiety: 3


Agitation: 2


Paroxysmal Sweats: 3


Orientation: 0-Oriented


Tacttile Disturbances: 0-None


Auditory Disturbances: 0-None


Visual Disturbances: 0-None


Headache: 2-Mild


CIWA-Ar Total Score: 10





BHS COWS





- Scale


Resting Pulse: 0= W[place] 80 or Below


Sweating: 3= Beads of Sweat on Face


Restless Observation: 1= Difficult to Sit Still


Pupil Size: 0= Normal to Room Light


Bone or Joint Aches: 2= Severe Diffuse Aches


Runny Nose/ Eye Tearin= None


GI Upset > 30mins: 0= None


Tremor Observation of Outstretched Hands: 0= None


Yawning Observation: 1= 1-2x During Session


Anxiety or Irritability: 2=Irritable/Anxious


Goose Flesh Skin: 0=Smooth Skin


COWS Score: 9





BHS Progress Note (SOAP)


Subjective: 





c/o sweats, muscle pain, anxiety/irritability.


Objective: 





19 12:13


 Vital Signs











  19





  04:38 06:00 10:00


 


Temperature  96.1 F L 98.6 F


 


Pulse Rate  66 68


 


Respiratory 18 18 18





Rate   


 


Blood Pressure  127/81 151/93








 Lab Results











WBC  7.2 K/mm3 (4.0-10.0)   19  12:15    


 


RBC  4.42 M/mm3 (3.60-5.2)   19  12:15    


 


Hgb  14.4 GM/dL (10.7-15.3)   19  12:15    


 


Hct  43.5 % (32.4-45.2)   19  12:15    


 


MCV  98.3 fl (80-96)  H  19  12:15    


 


MCHC  33.2 g/dl (32.0-36.0)   19  12:15    


 


RDW  16.1 % (11.6-15.6)  H  19  12:15    


 


Plt Count  146 K/MM3 (134-434)   19  12:15    


 


Sodium  142 mmol/L (136-145)   19  12:15    


 


Potassium  3.6 mmol/L (3.5-5.1)   19  12:15    


 


Chloride  108 mmol/L ()  H  19  12:15    


 


Carbon Dioxide  29 mmol/L (21-32)   19  12:15    


 


Anion Gap  5 MMOL/L (8-16)  L  19  12:15    


 


BUN  16.7 mg/dL (7-18)   19  12:15    


 


Creatinine  0.7 mg/dL (0.55-1.3)   19  12:15    


 


Random Glucose  89 mg/dL ()   19  12:15    


 


Calcium  9.0 mg/dL (8.5-10.1)   19  12:15    








Labs noted.


Assessment: 





19 12:14


AOX3, in no acute respiratory distress.


Full ROM, ambulating in the unit.


Withdrawal symptoms.


Plan: 





continue detox.
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
## 2019-08-25 NOTE — PN
[facility] CIWA





- CIWA Score


Nausea/Vomitin-No Nausea/No Vomiting


Muscle Tremors: None


Anxiety: 3


Agitation: 3


Paroxysmal Sweats: No Perspiration


Orientation: 0-Oriented


Tacttile Disturbances: 0-None


Auditory Disturbances: 0-None


Visual Disturbances: 0-None


Headache: 0-None Present


CIWA-Ar Total Score: 6





BHS COWS





- Scale


Resting Pulse: 0= [place] 80 or Below


Sweatin= No chills or Flushing


Restless Observation: 0= Sits Still


Pupil Size: 0= Normal to Room Light


Bone or Joint Aches: 0= None


Runny Nose/ Eye Tearin= None


GI Upset > 30mins: 0= None


Tremor Observation of Outstretched Hands: 0= None


Yawning Observation: 0= None


Anxiety or Irritability: 2=Irritable/Anxious


Goose Flesh Skin: 0=Smooth Skin


COWS Score: 2





BHS Progress Note (SOAP)


Subjective: 





Back pain, cramps, sweating, chills, interrupted sleep; patient easily agitated


Objective: 





19 16:42


 Last Vital Signs











Temp Pulse Resp BP Pulse Ox


 


 97.7 F   70   16   131/85    


 


 19 09:43  19 09:43  19 09:43  19 09:43   








Elevated b/p, has htn (on meds)





 Laboratory Tests











  19





  12:15 12:15 12:15


 


WBC  7.2  


 


RBC  4.42  


 


Hgb  14.4  


 


Hct  43.5  


 


MCV  98.3 H  


 


MCH  32.6  


 


MCHC  33.2  


 


RDW  16.1 H  


 


Plt Count  146  


 


MPV  10.1  


 


Sodium   142 


 


Potassium   3.6 


 


Chloride   108 H 


 


Carbon Dioxide   29 


 


Anion Gap   5 L 


 


BUN   16.7 


 


Creatinine   0.7 


 


Est GFR (CKD-EPI)AfAm   118.74 


 


Est GFR (CKD-EPI)NonAf   102.45 


 


Random Glucose   89 


 


Calcium   9.0 


 


Total Bilirubin   0.9 


 


AST   99 H 


 


ALT   108 H 


 


Alkaline Phosphatase   128 H 


 


Total Protein   7.3 


 


Albumin   4.1 


 


POC Urine HCG, Qual   


 


RPR Titer    Nonreactive














  19





  12:17


 


WBC 


 


RBC 


 


Hgb 


 


Hct 


 


MCV 


 


MCH 


 


MCHC 


 


RDW 


 


Plt Count 


 


MPV 


 


Sodium 


 


Potassium 


 


Chloride 


 


Carbon Dioxide 


 


Anion Gap 


 


BUN 


 


Creatinine 


 


Est GFR (CKD-EPI)AfAm 


 


Est GFR (CKD-EPI)NonAf 


 


Random Glucose 


 


Calcium 


 


Total Bilirubin 


 


AST 


 


ALT 


 


Alkaline Phosphatase 


 


Total Protein 


 


Albumin 


 


POC Urine HCG, Qual  Negative


 


RPR Titer 








Labs reviewed: elevated LFTs due to chronic substance use


Assessment: 





19 16:44


Withdrawal sxs


Plan: 





Continue detox


Encouraged PO water intake


Scheduled for discharge tomorrow


Gabapentin 100mg PO TID started for back pain (patient stated she takes 

gabapentin 300mg TID at home)


Vistaril increased to 50mg PO q6hr prn for withdrawal sxs/anxiety

## 2019-08-26 NOTE — PN
BHS Progress Note


Note: 





Patient is discharged today. Script for 30 days supply of Seroquel 50 mg/hs is 

electronically transmitted to [facility] at [address], [place] 

[place] [address]

## 2019-08-26 NOTE — DS
[facility]HS Detox Discharge Summary


Admission Date: 


08/21/19





Discharge Date: 08/26/19





- History


Present History: Alcohol Dependence, Cannabis Dependence, Cocaine Dependence, 

Opioid Dependence





- Physical Exam Results


Vital Signs: 


 Vital Signs











Temperature  96.6 F L  08/26/19 06:00


 


Pulse Rate  64   08/26/19 06:00


 


Respiratory Rate  18   08/26/19 06:00


 


Blood Pressure  139/88   08/26/19 06:00


 


O2 Sat by Pulse Oximetry (%)      











Pertinent Admission Physical Exam Findings: 





pt arrived in withdrawals


 Laboratory Tests











  08/21/19 08/21/19 08/21/19





  12:15 12:15 12:15


 


WBC  7.2  


 


RBC  4.42  


 


Hgb  14.4  


 


Hct  43.5  


 


MCV  98.3 H  


 


MCH  32.6  


 


MCHC  33.2  


 


RDW  16.1 H  


 


Plt Count  146  


 


MPV  10.1  


 


Sodium   142 


 


Potassium   3.6 


 


Chloride   108 H 


 


Carbon Dioxide   29 


 


Anion Gap   5 L 


 


BUN   16.7 


 


Creatinine   0.7 


 


Est GFR (CKD-EPI)AfAm   118.74 


 


Est GFR (CKD-EPI)NonAf   102.45 


 


Random Glucose   89 


 


Calcium   9.0 


 


Total Bilirubin   0.9 


 


AST   99 H 


 


ALT   108 H 


 


Alkaline Phosphatase   128 H 


 


Total Protein   7.3 


 


Albumin   4.1 


 


POC Urine HCG, Qual   


 


RPR Titer    Nonreactive














  08/21/19





  12:17


 


WBC 


 


RBC 


 


Hgb 


 


Hct 


 


MCV 


 


MCH 


 


MCHC 


 


RDW 


 


Plt Count 


 


MPV 


 


Sodium 


 


Potassium 


 


Chloride 


 


Carbon Dioxide 


 


Anion Gap 


 


BUN 


 


Creatinine 


 


Est GFR (CKD-EPI)AfAm 


 


Est GFR (CKD-EPI)NonAf 


 


Random Glucose 


 


Calcium 


 


Total Bilirubin 


 


AST 


 


ALT 


 


Alkaline Phosphatase 


 


Total Protein 


 


Albumin 


 


POC Urine HCG, Qual  Negative


 


RPR Titer 








today pt is aaox3


ambulating


no acute distress


no s/s of withdrawals





- Treatment


Hospital Course: Detox Protocol Followed, Detoxed Safely, Responded well, 

Discharged Condition Good, Rehab Referral Accepted


Patient has Accepted a Rehab Referral to: pt declined rehab; referral provided





- Medication


Discharge Medications: 


Ambulatory Orders





Tenofovir Alafenamide Fumarate [Vemlidy] 25 mg PO DAILY 09/10/18 


Quetiapine Fumarate [Seroquel] 200 mg PO BID 09/12/18 


Enalapril Maleate [Vasotec -] 5 mg PO DAILY #30 tablet 09/16/18 


Metoprolol Succinate [Toprol XL -] 25 mg PO DAILY #30 tab.sr.24h 09/16/18 


Ranitidine [Zantac -] 150 mg PO DAILY 02/11/19 


Albuterol Sulfate Inhaler - [Ventolin HFA Inhaler -] 2 puff IH Q4H PRN #1 

inhaler 02/16/19 


Aspirin [ASA -] 81 mg PO DAILY #30 tab.chew 02/16/19 


Atorvastatin Ca [Lipitor] 40 mg PO DAILY #30 tablet 02/16/19 


Pantoprazole Sodium [Protonix -] 40 mg PO DAILY@0600 #10 tablet.ec 02/16/19 


Clopidogrel Bisulfate [Plavix -] 75 mg PO DAILY 08/21/19 


Mirtazapine 15 mg PO DAILY 08/21/19 


Ondansetron [Zofran *Odt*] 4 mg SL TID PRN 08/21/19 











- Diagnosis


(1) Alcohol dependence with withdrawal


Current Visit: Yes   Status: Chronic   


Qualifiers: 


   Complication of substance-induced condition: uncomplicated   Qualified Code(s

): F10.230 - Alcohol dependence with withdrawal, uncomplicated   





(2) Elevated liver enzymes


Current Visit: Yes   Status: Acute   





(3) Opioid dependence with withdrawal


Current Visit: Yes   Status: Chronic   





(4) Asthma


Current Visit: Yes   Status: Chronic   


Qualifiers: 


   Asthma severity: mild   Asthma persistence: intermittent   Asthma 

complication type: with status asthmaticus   Qualified Code(s): J45.22 - Mild 

intermittent asthma with status asthmaticus   





(5) Bipolar disorder


Current Visit: Yes   Status: Chronic   





(6) Cannabis dependence


Current Visit: Yes   Status: Chronic   





(7) Cocaine dependence


Current Visit: Yes   Status: Chronic   


Qualifiers: 


   Substance use status: uncomplicated   Qualified Code(s): F14.20 - Cocaine 

dependence, uncomplicated   





(8) Nicotine dependence


Current Visit: Yes   Status: Chronic   


Qualifiers: 


   Nicotine product type: cigarettes   Substance use status: uncomplicated   

Qualified Code(s): F17.210 - Nicotine dependence, cigarettes, uncomplicated   





(9) Non-compliance


Current Visit: Yes   Status: Chronic   





(10) Schizoaffective disorder


Current Visit: Yes   Status: Chronic   


Qualifiers: 


   Schizoaffective disorder type: bipolar   Qualified Code(s): F25.0 - 

Schizoaffective disorder, bipolar type   





(11) Substance induced mood disorder


Current Visit: Yes   Status: Chronic   





(12) Elevated blood pressure reading in office with diagnosis of hypertension


Current Visit: Yes   Status: Acute   





(13) Anxiety


Current Visit: Yes   Status: Chronic   





(14) CAD (coronary artery disease)


Current Visit: Yes   Status: Chronic   





(15) Depression


Current Visit: No   Status: Chronic   


Qualifiers: 


   Depression Type: unspecified   Qualified Code(s): F32.9 - Major depressive 

disorder, single episode, unspecified   





(16) HTN (hypertension)


Current Visit: Yes   Status: Chronic   


Qualifiers: 


   Hypertension type: essential hypertension   Qualified Code(s): I10 - 

Essential (primary) hypertension   





(17) Heart failure


Current Visit: Yes   Status: Chronic   


Qualifiers: 


   Heart failure type: unspecified   Heart failure chronicity: unspecified   

Qualified Code(s): I50.9 - Heart failure, unspecified   





(18) Hep B w/o coma


Current Visit: No   Status: Chronic   





(19) Hyperlipidemia


Current Visit: Yes   Status: Chronic   


Qualifiers: 


   Hyperlipidemia type: unspecified   Qualified Code(s): E78.5 - Hyperlipidemia

, unspecified   





(20) Insomnia


Current Visit: No   Status: Chronic   





- AMA


Did Patient Leave Against Medical Advice: No

## 2019-09-17 ENCOUNTER — HOSPITAL ENCOUNTER (INPATIENT)
Dept: HOSPITAL 74 - YASAS | Age: 49
LOS: 3 days | Discharge: OTHER INSTITUTION | DRG: 773 | End: 2019-09-20
Attending: SURGERY | Admitting: SURGERY
Payer: COMMERCIAL

## 2019-09-17 VITALS — BODY MASS INDEX: 20.3 KG/M2

## 2019-09-17 DIAGNOSIS — I50.9: ICD-10-CM

## 2019-09-17 DIAGNOSIS — Z87.828: ICD-10-CM

## 2019-09-17 DIAGNOSIS — Z86.73: ICD-10-CM

## 2019-09-17 DIAGNOSIS — G47.00: ICD-10-CM

## 2019-09-17 DIAGNOSIS — Z86.19: ICD-10-CM

## 2019-09-17 DIAGNOSIS — R94.5: ICD-10-CM

## 2019-09-17 DIAGNOSIS — B19.10: ICD-10-CM

## 2019-09-17 DIAGNOSIS — I25.10: ICD-10-CM

## 2019-09-17 DIAGNOSIS — Z95.5: ICD-10-CM

## 2019-09-17 DIAGNOSIS — F14.20: ICD-10-CM

## 2019-09-17 DIAGNOSIS — F17.210: ICD-10-CM

## 2019-09-17 DIAGNOSIS — F19.24: ICD-10-CM

## 2019-09-17 DIAGNOSIS — F31.9: ICD-10-CM

## 2019-09-17 DIAGNOSIS — F10.230: Primary | ICD-10-CM

## 2019-09-17 DIAGNOSIS — F11.23: ICD-10-CM

## 2019-09-17 DIAGNOSIS — I11.0: ICD-10-CM

## 2019-09-17 DIAGNOSIS — Z91.19: ICD-10-CM

## 2019-09-17 PROCEDURE — HZ2ZZZZ DETOXIFICATION SERVICES FOR SUBSTANCE ABUSE TREATMENT: ICD-10-PCS | Performed by: ALLERGY & IMMUNOLOGY

## 2019-09-17 RX ADMIN — ATORVASTATIN CALCIUM SCH MG: 40 TABLET, FILM COATED ORAL at 22:07

## 2019-09-17 RX ADMIN — Medication SCH MG: at 22:06

## 2019-09-17 RX ADMIN — CLOPIDOGREL BISULFATE SCH MG: 75 TABLET, FILM COATED ORAL at 20:26

## 2019-09-17 RX ADMIN — Medication PRN MG: at 22:07

## 2019-09-17 RX ADMIN — ENALAPRIL MALEATE SCH MG: 10 TABLET ORAL at 20:26

## 2019-09-17 RX ADMIN — HYDROXYZINE PAMOATE PRN MG: 25 CAPSULE ORAL at 22:07

## 2019-09-17 RX ADMIN — ASPIRIN 81 MG SCH MG: 81 TABLET ORAL at 20:25
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
## 2019-09-18 RX ADMIN — ATORVASTATIN CALCIUM SCH MG: 40 TABLET, FILM COATED ORAL at 22:06

## 2019-09-18 RX ADMIN — Medication SCH TAB: at 10:04

## 2019-09-18 RX ADMIN — ENALAPRIL MALEATE SCH MG: 10 TABLET ORAL at 10:05

## 2019-09-18 RX ADMIN — Medication SCH: at 13:39

## 2019-09-18 RX ADMIN — ASPIRIN 81 MG SCH MG: 81 TABLET ORAL at 10:04

## 2019-09-18 RX ADMIN — Medication PRN MG: at 22:06

## 2019-09-18 RX ADMIN — ALUMINUM HYDROXIDE, MAGNESIUM HYDROXIDE, AND SIMETHICONE PRN ML: 200; 200; 20 SUSPENSION ORAL at 12:11

## 2019-09-18 RX ADMIN — CLOPIDOGREL BISULFATE SCH MG: 75 TABLET, FILM COATED ORAL at 10:06

## 2019-09-18 RX ADMIN — RANITIDINE SCH MG: 150 TABLET ORAL at 10:06

## 2019-09-18 RX ADMIN — MIRTAZAPINE SCH MG: 15 TABLET, FILM COATED ORAL at 22:07

## 2019-09-18 RX ADMIN — Medication SCH MG: at 22:06
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
## 2019-09-18 NOTE — PN
BHS COWS





- Scale


Resting Pulse: 0= [place] 80 or Below


Sweatin= Chills/Flushing


Restless Observation: 1= Difficult to Sit Still


Pupil Size: 1= Pupils >than Normal


Bone or Joint Aches: 1= Mild Discomfort


Runny Nose/ Eye Tearin= None


GI Upset > 30mins: 2= Nausea/Diarrhea


Tremor Observation of Outstretched Hands: 2= Slight Tremor Visible


Yawning Observation: 1= 1-2x During Session


Anxiety or Irritability: 2=Irritable/Anxious


Goose Flesh Skin: 3=Piloerection


COWS Score: 14





BHS Progress Note (SOAP)


Subjective: 





48 years old female 5th patient [facility] [place], operated by [facility] admission since 


admitted on 19 for opiate withdrawal sx management


begin methadone detox regimen


long history of hypertension treated with enalapril and metoprolol


both medication initiated upon admission 


discontinue clonidine 


begin valium prn for withdrawal sx management


increase elanapril to 10 mg and metoprolol to 50 mg


Objective: 





19 09:45


 Vital Signs











Temperature  98.1 F   19 09:35


 


Pulse Rate  48 L  19 09:35


 


Respiratory Rate  18   19 09:35


 


Blood Pressure  181/104 H  19 09:35


 


O2 Sat by Pulse Oximetry (%)      











19 09:46


lab see 2019


Assessment: 





19 09:47


opiate withdrawal sx 


Plan: 





continue methadone detox regimen

## 2019-09-19 RX ADMIN — ENALAPRIL MALEATE SCH MG: 10 TABLET ORAL at 11:10

## 2019-09-19 RX ADMIN — METHOCARBAMOL PRN MG: 500 TABLET ORAL at 11:13

## 2019-09-19 RX ADMIN — ATORVASTATIN CALCIUM SCH MG: 40 TABLET, FILM COATED ORAL at 22:06

## 2019-09-19 RX ADMIN — ASPIRIN 81 MG SCH MG: 81 TABLET ORAL at 11:10

## 2019-09-19 RX ADMIN — Medication SCH: at 19:10

## 2019-09-19 RX ADMIN — HYDROXYZINE PAMOATE PRN MG: 25 CAPSULE ORAL at 11:15

## 2019-09-19 RX ADMIN — Medication PRN MG: at 22:07

## 2019-09-19 RX ADMIN — Medication SCH MG: at 22:05

## 2019-09-19 RX ADMIN — MIRTAZAPINE SCH MG: 15 TABLET, FILM COATED ORAL at 22:06

## 2019-09-19 RX ADMIN — METHOCARBAMOL PRN MG: 500 TABLET ORAL at 22:07

## 2019-09-19 RX ADMIN — RANITIDINE SCH MG: 150 TABLET ORAL at 11:10

## 2019-09-19 RX ADMIN — ALUMINUM HYDROXIDE, MAGNESIUM HYDROXIDE, AND SIMETHICONE PRN ML: 200; 200; 20 SUSPENSION ORAL at 11:14

## 2019-09-19 RX ADMIN — CLOPIDOGREL BISULFATE SCH MG: 75 TABLET, FILM COATED ORAL at 11:10

## 2019-09-19 RX ADMIN — Medication SCH TAB: at 11:11

## 2019-09-19 NOTE — PN
BHS COWS





- Scale


Resting Pulse: 1= NE 


Sweatin= Chills/Flushing


Restless Observation: 0= Sits Still


Pupil Size: 0= Normal to Room Light


Bone or Joint Aches: 1= Mild Discomfort


Runny Nose/ Eye Tearin= Nasal Congestion


GI Upset > 30mins: 1= Stomach Cramp


Tremor Observation of Outstretched Hands: 2= Slight Tremor Visible


Yawning Observation: 1= 1-2x During Session


Anxiety or Irritability: 2=Irritable/Anxious


Goose Flesh Skin: 0=Smooth Skin


COWS Score: 10





BHS Progress Note (SOAP)


Subjective: 





doing well with methadone detox regimen


requests ativan prn instead of valium 


discuss medication assisted treatment program 


encourage  narcan from pharmacy 


discuss aftercare with staff [facility] 





requests ativan prn instead of valium 


change ativan prn


Objective: 





19 13:35


 Vital Signs











Temperature  97.7 F   19 10:00


 


Pulse Rate  86   19 10:00


 


Respiratory Rate  16   19 10:00


 


Blood Pressure  151/84   19 10:00


 


O2 Sat by Pulse Oximetry (%)      








 Laboratory Last Values











HIV 1&2 Antibody Screen  Negative   19  07:50    


 


HIV P24 Antigen  Negative   19  07:50    











19 13:36


see lab 19 report


unremarkable 


no need for repeat 


19 13:37





Assessment: 





19 13:38


opiate withdrawal sx


Plan: 





continue methadone detox regimen

## 2019-09-19 NOTE — PN
BHS Progress Note


Note: 





received nurse called that 2pm enalapril been discontinued without apparent 

reason


reorder enalapril 10 mg po now

## 2019-09-20 ENCOUNTER — HOSPITAL ENCOUNTER (INPATIENT)
Dept: HOSPITAL 74 - YASAS | Age: 49
LOS: 7 days | Discharge: HOME | DRG: 772 | End: 2019-09-27
Attending: NEUROMUSCULOSKELETAL MEDICINE & OMM | Admitting: NEUROMUSCULOSKELETAL MEDICINE & OMM
Payer: COMMERCIAL

## 2019-09-20 VITALS — TEMPERATURE: 96.7 F | HEART RATE: 84 BPM | DIASTOLIC BLOOD PRESSURE: 78 MMHG | SYSTOLIC BLOOD PRESSURE: 111 MMHG

## 2019-09-20 DIAGNOSIS — Z86.73: ICD-10-CM

## 2019-09-20 DIAGNOSIS — R94.31: ICD-10-CM

## 2019-09-20 DIAGNOSIS — F31.9: ICD-10-CM

## 2019-09-20 DIAGNOSIS — I11.0: ICD-10-CM

## 2019-09-20 DIAGNOSIS — M54.5: ICD-10-CM

## 2019-09-20 DIAGNOSIS — I50.9: ICD-10-CM

## 2019-09-20 DIAGNOSIS — E78.5: ICD-10-CM

## 2019-09-20 DIAGNOSIS — K21.9: ICD-10-CM

## 2019-09-20 DIAGNOSIS — F10.20: ICD-10-CM

## 2019-09-20 DIAGNOSIS — F19.24: ICD-10-CM

## 2019-09-20 DIAGNOSIS — Z95.5: ICD-10-CM

## 2019-09-20 DIAGNOSIS — F11.20: Primary | ICD-10-CM

## 2019-09-20 DIAGNOSIS — I25.10: ICD-10-CM

## 2019-09-20 DIAGNOSIS — F17.210: ICD-10-CM

## 2019-09-20 DIAGNOSIS — G47.00: ICD-10-CM

## 2019-09-20 DIAGNOSIS — R00.1: ICD-10-CM

## 2019-09-20 DIAGNOSIS — J45.909: ICD-10-CM

## 2019-09-20 DIAGNOSIS — F14.20: ICD-10-CM

## 2019-09-20 DIAGNOSIS — Z86.19: ICD-10-CM

## 2019-09-20 DIAGNOSIS — R10.9: ICD-10-CM

## 2019-09-20 PROCEDURE — HZ42ZZZ GROUP COUNSELING FOR SUBSTANCE ABUSE TREATMENT, COGNITIVE-BEHAVIORAL: ICD-10-PCS | Performed by: ALLERGY & IMMUNOLOGY

## 2019-09-20 RX ADMIN — HYDROXYZINE PAMOATE PRN MG: 25 CAPSULE ORAL at 04:07

## 2019-09-20 RX ADMIN — METHOCARBAMOL PRN MG: 500 TABLET ORAL at 10:40

## 2019-09-20 RX ADMIN — Medication PRN MG: at 21:45

## 2019-09-20 RX ADMIN — CLOPIDOGREL BISULFATE SCH MG: 75 TABLET, FILM COATED ORAL at 10:40

## 2019-09-20 RX ADMIN — METHOCARBAMOL PRN MG: 500 TABLET ORAL at 04:07

## 2019-09-20 RX ADMIN — ENALAPRIL MALEATE SCH MG: 10 TABLET ORAL at 21:45

## 2019-09-20 RX ADMIN — Medication SCH TAB: at 10:40

## 2019-09-20 RX ADMIN — RANITIDINE SCH MG: 150 TABLET ORAL at 10:41

## 2019-09-20 RX ADMIN — ATORVASTATIN CALCIUM SCH MG: 40 TABLET, FILM COATED ORAL at 21:45

## 2019-09-20 RX ADMIN — RANITIDINE SCH MG: 150 TABLET ORAL at 21:45

## 2019-09-20 RX ADMIN — Medication SCH MG: at 21:45

## 2019-09-20 RX ADMIN — QUETIAPINE FUMARATE SCH MG: 100 TABLET ORAL at 21:45

## 2019-09-20 RX ADMIN — HYDROXYZINE PAMOATE PRN MG: 25 CAPSULE ORAL at 10:40

## 2019-09-20 RX ADMIN — ASPIRIN 81 MG SCH MG: 81 TABLET ORAL at 10:40

## 2019-09-20 NOTE — HP
BHS MD Rehab Assess/Revision





- Admission History


[facility]hab from[facility]


Date of Admission to Rehab: 09/20/2019





- Vital signs


Vital Signs: 





NOTED; STABLE.





- Findings


Detox History & Physical reviewed: Yes


Concur with findings: Yes


Comments/Additional Findings: PATIENT'S MEDICAL / MEDICATION HISTORY REVIEWED 

PRIOR TO DISCHARGE FROM DETOX UNIT. PATIENT WAS DISCHARGED FROM DETOX UNIT TO 

BE TAKEN OVER TO REHAB UNIT IN STABLE MEDICAL CONDITION.





Inpatient Rehab Admission





- Rehab Decision to Admit


Inpatient rehab admission?: Yes





- Initial Determination


Are CD services needed?: Yes


Free of communicable disease: Yes


Not in need of hospitalization: Yes





- Rehab Admission Criteria


Previous failed treatment: Yes


Poor recovery environment: Yes


Comorbidities: Yes


Lacks judgement: Yes


Patient is meeting Inpatient Rehab admission criteria:: Yes

## 2019-09-20 NOTE — PN
BHS Progress Note


Note: 





Psychiatry   


Attending's note :


Approached by patient.


Reason : refractory insomnia and anxiety.


Ms Landa is requesting seroquel dose increase.


Psychoeducation : done. Side effects/benefits reviewed.


Seroquel is now raised to 100 mg po hs. Patient agrees.
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
## 2019-09-21 RX ADMIN — ASPIRIN 81 MG SCH MG: 81 TABLET ORAL at 10:32

## 2019-09-21 RX ADMIN — RANITIDINE SCH MG: 150 TABLET ORAL at 10:32

## 2019-09-21 RX ADMIN — QUETIAPINE FUMARATE SCH MG: 100 TABLET ORAL at 21:07

## 2019-09-21 RX ADMIN — Medication PRN MG: at 21:08

## 2019-09-21 RX ADMIN — Medication SCH TAB: at 10:32

## 2019-09-21 RX ADMIN — ENALAPRIL MALEATE SCH MG: 10 TABLET ORAL at 10:32

## 2019-09-21 RX ADMIN — HYDROXYZINE PAMOATE PRN MG: 25 CAPSULE ORAL at 13:51

## 2019-09-21 RX ADMIN — CYCLOBENZAPRINE HYDROCHLORIDE PRN MG: 10 TABLET, FILM COATED ORAL at 21:09

## 2019-09-21 RX ADMIN — HYDROXYZINE PAMOATE PRN MG: 25 CAPSULE ORAL at 21:08

## 2019-09-21 RX ADMIN — ENALAPRIL MALEATE SCH MG: 10 TABLET ORAL at 21:07

## 2019-09-21 RX ADMIN — MIRTAZAPINE SCH MG: 15 TABLET, FILM COATED ORAL at 21:07

## 2019-09-21 RX ADMIN — CLOPIDOGREL BISULFATE SCH MG: 75 TABLET, FILM COATED ORAL at 06:56

## 2019-09-21 RX ADMIN — Medication SCH MG: at 21:07

## 2019-09-21 RX ADMIN — CYCLOBENZAPRINE HYDROCHLORIDE PRN MG: 10 TABLET, FILM COATED ORAL at 11:46

## 2019-09-21 RX ADMIN — ATORVASTATIN CALCIUM SCH MG: 40 TABLET, FILM COATED ORAL at 21:07

## 2019-09-21 RX ADMIN — RANITIDINE SCH MG: 150 TABLET ORAL at 21:07
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
## 2019-09-22 RX ADMIN — Medication SCH MG: at 21:03

## 2019-09-22 RX ADMIN — ASPIRIN 81 MG SCH MG: 81 TABLET ORAL at 09:37

## 2019-09-22 RX ADMIN — CYCLOBENZAPRINE HYDROCHLORIDE PRN MG: 10 TABLET, FILM COATED ORAL at 21:05

## 2019-09-22 RX ADMIN — Medication SCH TAB: at 09:37

## 2019-09-22 RX ADMIN — ATORVASTATIN CALCIUM SCH MG: 40 TABLET, FILM COATED ORAL at 21:03

## 2019-09-22 RX ADMIN — Medication PRN MG: at 21:04

## 2019-09-22 RX ADMIN — HYDROXYZINE PAMOATE PRN MG: 25 CAPSULE ORAL at 18:54

## 2019-09-22 RX ADMIN — ENALAPRIL MALEATE SCH MG: 10 TABLET ORAL at 21:03

## 2019-09-22 RX ADMIN — RANITIDINE SCH MG: 150 TABLET ORAL at 09:38

## 2019-09-22 RX ADMIN — QUETIAPINE FUMARATE SCH MG: 100 TABLET ORAL at 21:03

## 2019-09-22 RX ADMIN — CYCLOBENZAPRINE HYDROCHLORIDE PRN MG: 10 TABLET, FILM COATED ORAL at 12:32

## 2019-09-22 RX ADMIN — MIRTAZAPINE SCH MG: 15 TABLET, FILM COATED ORAL at 21:03

## 2019-09-22 RX ADMIN — ENALAPRIL MALEATE SCH MG: 10 TABLET ORAL at 09:37

## 2019-09-22 RX ADMIN — CLOPIDOGREL BISULFATE SCH MG: 75 TABLET, FILM COATED ORAL at 07:07

## 2019-09-22 RX ADMIN — RANITIDINE SCH MG: 150 TABLET ORAL at 21:03

## 2019-09-22 RX ADMIN — HYDROXYZINE PAMOATE PRN MG: 25 CAPSULE ORAL at 12:32

## 2019-09-22 NOTE — PN
[facility] Progress Note


Note: 


 CALLED TO EVAL PT ON UNIT


CO VOMITING, BLOODY EMESIS


ON ARRIVAL PT TREPORTS NAUSEA AND NO PO FLUIDS SINCE ADMISSION


 Vital Signs - 24 hr











  09/22/19 09/22/19 09/22/19





  00:30 03:30 07:25


 


Temperature   97.4 F L


 


Pulse Rate   53 L


 


Respiratory 18 18 16





Rate   


 


Blood Pressure   163/97














  09/22/19 09/22/19





  08:26 09:32


 


Temperature 97.9 F 


 


Pulse Rate 54 L 53 L


 


Respiratory 20 19





Rate  


 


Blood Pressure 181/102 H 182/103 H








BP ELEVATED


ANXIOUS


CO ABD PAIN


DIFFUSE TENDERNESS





AP


ABD PAIN, POST ACUTE WITHDRAWAL


OBSERVATION


ENCOURAGE PO


CONSIDER LABS IF SX PROGRESS








EEVATED BP


CONT TO MONITOR POST MEDS

## 2019-09-23 RX ADMIN — HYDROXYZINE PAMOATE PRN MG: 25 CAPSULE ORAL at 21:07

## 2019-09-23 RX ADMIN — Medication SCH EACH: at 21:09

## 2019-09-23 RX ADMIN — Medication SCH TAB: at 10:11

## 2019-09-23 RX ADMIN — ATORVASTATIN CALCIUM SCH MG: 40 TABLET, FILM COATED ORAL at 21:07

## 2019-09-23 RX ADMIN — MIRTAZAPINE SCH MG: 15 TABLET, FILM COATED ORAL at 21:07

## 2019-09-23 RX ADMIN — CYCLOBENZAPRINE HYDROCHLORIDE PRN MG: 10 TABLET, FILM COATED ORAL at 21:12

## 2019-09-23 RX ADMIN — RANITIDINE SCH MG: 150 TABLET ORAL at 21:07

## 2019-09-23 RX ADMIN — ASPIRIN 81 MG SCH MG: 81 TABLET ORAL at 10:11

## 2019-09-23 RX ADMIN — QUETIAPINE FUMARATE SCH MG: 100 TABLET ORAL at 21:07

## 2019-09-23 RX ADMIN — ENALAPRIL MALEATE SCH MG: 10 TABLET ORAL at 22:27

## 2019-09-23 RX ADMIN — Medication SCH MG: at 21:08

## 2019-09-23 RX ADMIN — CYCLOBENZAPRINE HYDROCHLORIDE PRN MG: 10 TABLET, FILM COATED ORAL at 10:13

## 2019-09-23 RX ADMIN — LIDOCAINE SCH PATCH: 50 PATCH TOPICAL at 12:30

## 2019-09-23 RX ADMIN — RANITIDINE SCH MG: 150 TABLET ORAL at 10:12

## 2019-09-23 RX ADMIN — ALUMINUM HYDROXIDE, MAGNESIUM HYDROXIDE, AND SIMETHICONE PRN ML: 200; 200; 20 SUSPENSION ORAL at 07:47

## 2019-09-23 RX ADMIN — HYDROXYZINE PAMOATE PRN MG: 25 CAPSULE ORAL at 10:13

## 2019-09-23 RX ADMIN — ENALAPRIL MALEATE SCH MG: 10 TABLET ORAL at 10:12

## 2019-09-23 RX ADMIN — HYDROXYZINE PAMOATE PRN MG: 25 CAPSULE ORAL at 14:19

## 2019-09-23 RX ADMIN — CLOPIDOGREL BISULFATE SCH MG: 75 TABLET, FILM COATED ORAL at 06:34

## 2019-09-23 RX ADMIN — Medication PRN MG: at 21:08

## 2019-09-23 NOTE — PN
BHS Progress Note


Note: 





Patient's blood pressure is B/P 180/115. Patient is asymptomatic


 Vital Signs











Temperature  97.7 F   09/23/19 06:27


 


Pulse Rate  57 L  09/23/19 06:27


 


Respiratory Rate  18   09/23/19 06:27


 


Blood Pressure  180/115 H  09/23/19 06:27


 


O2 Sat by Pulse Oximetry (%)      








Action: Clonidine 0.1mg tablet oral ordered

## 2019-09-23 NOTE — PN
[facility]HS Progress Note


Note: 





Patient c/o LBP and body aches which she states happens when she stops "using". 

Patient denies recent hx of injury or falls. 


 Vital Signs











Temperature  97.7 F   09/23/19 06:27


 


Pulse Rate  69   09/23/19 10:00


 


Respiratory Rate  18   09/23/19 06:27


 


Blood Pressure  126/82   09/23/19 10:00


 


O2 Sat by Pulse Oximetry (%)      








PE





alert and oriented x 3


skin warm and dry


+perrla, eoms intact, + jaundiced sclera


ms + tenderness to ls spine area


ext full rom, amb ad tono





A/P





LBP





Patient has hx of Hep c so unable to have apap/ibu prn


will continued flexeril


add lidocaine patch to area daily


monitor

## 2019-09-24 ENCOUNTER — HOSPITAL ENCOUNTER (OUTPATIENT)
Dept: HOSPITAL 74 - JER | Age: 49
Setting detail: OBSERVATION
Discharge: OTHER INSTITUTION | End: 2019-09-24
Attending: GENERAL ACUTE CARE HOSPITAL | Admitting: GENERAL ACUTE CARE HOSPITAL
Payer: COMMERCIAL

## 2019-09-24 VITALS — TEMPERATURE: 98.2 F

## 2019-09-24 VITALS — HEART RATE: 67 BPM | SYSTOLIC BLOOD PRESSURE: 170 MMHG | DIASTOLIC BLOOD PRESSURE: 89 MMHG

## 2019-09-24 VITALS — BODY MASS INDEX: 20.3 KG/M2

## 2019-09-24 DIAGNOSIS — Z88.5: ICD-10-CM

## 2019-09-24 DIAGNOSIS — F10.20: ICD-10-CM

## 2019-09-24 DIAGNOSIS — Z88.8: ICD-10-CM

## 2019-09-24 DIAGNOSIS — K21.9: ICD-10-CM

## 2019-09-24 DIAGNOSIS — I50.9: ICD-10-CM

## 2019-09-24 DIAGNOSIS — Z95.5: ICD-10-CM

## 2019-09-24 DIAGNOSIS — F19.24: ICD-10-CM

## 2019-09-24 DIAGNOSIS — Z86.73: ICD-10-CM

## 2019-09-24 DIAGNOSIS — E78.5: ICD-10-CM

## 2019-09-24 DIAGNOSIS — F14.20: ICD-10-CM

## 2019-09-24 DIAGNOSIS — I25.10: ICD-10-CM

## 2019-09-24 DIAGNOSIS — F32.9: ICD-10-CM

## 2019-09-24 DIAGNOSIS — F12.20: ICD-10-CM

## 2019-09-24 DIAGNOSIS — J45.909: ICD-10-CM

## 2019-09-24 DIAGNOSIS — Z86.19: ICD-10-CM

## 2019-09-24 DIAGNOSIS — I11.0: ICD-10-CM

## 2019-09-24 DIAGNOSIS — F11.20: ICD-10-CM

## 2019-09-24 DIAGNOSIS — R07.89: Primary | ICD-10-CM

## 2019-09-24 LAB
ALBUMIN SERPL-MCNC: 3.7 G/DL (ref 3.4–5)
ALP SERPL-CCNC: 115 U/L (ref 45–117)
ALT SERPL-CCNC: 91 U/L (ref 13–61)
ANION GAP SERPL CALC-SCNC: 5 MMOL/L (ref 8–16)
AST SERPL-CCNC: 70 U/L (ref 15–37)
BASOPHILS # BLD: 0.2 % (ref 0–2)
BILIRUB SERPL-MCNC: 0.5 MG/DL (ref 0.2–1)
BNP SERPL-MCNC: 615.8 PG/ML (ref 5–125)
BUN SERPL-MCNC: 15.3 MG/DL (ref 7–18)
CALCIUM SERPL-MCNC: 9 MG/DL (ref 8.5–10.1)
CHLORIDE SERPL-SCNC: 105 MMOL/L (ref 98–107)
CO2 SERPL-SCNC: 29 MMOL/L (ref 21–32)
CREAT SERPL-MCNC: 0.8 MG/DL (ref 0.55–1.3)
DEPRECATED RDW RBC AUTO: 15.6 % (ref 11.6–15.6)
EOSINOPHIL # BLD: 1.5 % (ref 0–4.5)
GLUCOSE SERPL-MCNC: 82 MG/DL (ref 74–106)
HCT VFR BLD CALC: 44.5 % (ref 32.4–45.2)
HGB BLD-MCNC: 14.8 GM/DL (ref 10.7–15.3)
INR BLD: 1 (ref 0.83–1.09)
LYMPHOCYTES # BLD: 26.2 % (ref 8–40)
MAGNESIUM SERPL-MCNC: 2.5 MG/DL (ref 1.8–2.4)
MCH RBC QN AUTO: 32.8 PG (ref 25.7–33.7)
MCHC RBC AUTO-ENTMCNC: 33.2 G/DL (ref 32–36)
MCV RBC: 99 FL (ref 80–96)
MONOCYTES # BLD AUTO: 6.7 % (ref 3.8–10.2)
NEUTROPHILS # BLD: 65.4 % (ref 42.8–82.8)
PLATELET # BLD AUTO: 140 K/MM3 (ref 134–434)
PMV BLD: 9.3 FL (ref 7.5–11.1)
POTASSIUM SERPLBLD-SCNC: 4.7 MMOL/L (ref 3.5–5.1)
PROT SERPL-MCNC: 7 G/DL (ref 6.4–8.2)
PT PNL PPP: 11.8 SEC (ref 9.7–13)
RBC # BLD AUTO: 4.49 M/MM3 (ref 3.6–5.2)
SODIUM SERPL-SCNC: 139 MMOL/L (ref 136–145)
WBC # BLD AUTO: 7 K/MM3 (ref 4–10)

## 2019-09-24 PROCEDURE — 3E033NZ INTRODUCTION OF ANALGESICS, HYPNOTICS, SEDATIVES INTO PERIPHERAL VEIN, PERCUTANEOUS APPROACH: ICD-10-PCS | Performed by: GENERAL ACUTE CARE HOSPITAL

## 2019-09-24 PROCEDURE — G0378 HOSPITAL OBSERVATION PER HR: HCPCS

## 2019-09-24 PROCEDURE — 3E033GC INTRODUCTION OF OTHER THERAPEUTIC SUBSTANCE INTO PERIPHERAL VEIN, PERCUTANEOUS APPROACH: ICD-10-PCS | Performed by: GENERAL ACUTE CARE HOSPITAL

## 2019-09-24 RX ADMIN — ASPIRIN 81 MG SCH: 81 TABLET ORAL at 12:05

## 2019-09-24 RX ADMIN — RANITIDINE SCH MG: 150 TABLET ORAL at 21:57

## 2019-09-24 RX ADMIN — ENALAPRIL MALEATE SCH MG: 10 TABLET ORAL at 22:01

## 2019-09-24 RX ADMIN — CLOPIDOGREL BISULFATE SCH MG: 75 TABLET, FILM COATED ORAL at 06:44

## 2019-09-24 RX ADMIN — ASPIRIN 81 MG SCH MG: 81 TABLET ORAL at 08:01

## 2019-09-24 RX ADMIN — Medication SCH MG: at 21:57

## 2019-09-24 RX ADMIN — LIDOCAINE SCH: 50 PATCH TOPICAL at 12:05

## 2019-09-24 RX ADMIN — CYCLOBENZAPRINE HYDROCHLORIDE PRN MG: 10 TABLET, FILM COATED ORAL at 21:59

## 2019-09-24 RX ADMIN — HYDROXYZINE PAMOATE PRN MG: 25 CAPSULE ORAL at 18:54

## 2019-09-24 RX ADMIN — QUETIAPINE FUMARATE SCH MG: 100 TABLET ORAL at 21:57

## 2019-09-24 RX ADMIN — HYDROXYZINE PAMOATE PRN MG: 25 CAPSULE ORAL at 21:59

## 2019-09-24 RX ADMIN — Medication PRN MG: at 21:57

## 2019-09-24 RX ADMIN — RANITIDINE SCH: 150 TABLET ORAL at 12:08

## 2019-09-24 RX ADMIN — MIRTAZAPINE SCH MG: 15 TABLET, FILM COATED ORAL at 21:57

## 2019-09-24 RX ADMIN — Medication SCH: at 12:05

## 2019-09-24 RX ADMIN — ALUMINUM HYDROXIDE, MAGNESIUM HYDROXIDE, AND SIMETHICONE PRN ML: 200; 200; 20 SUSPENSION ORAL at 07:20

## 2019-09-24 RX ADMIN — ENALAPRIL MALEATE SCH: 10 TABLET ORAL at 12:08

## 2019-09-24 RX ADMIN — ATORVASTATIN CALCIUM SCH MG: 40 TABLET, FILM COATED ORAL at 21:57

## 2019-09-24 RX ADMIN — Medication SCH: at 22:01

## 2019-09-24 NOTE — CON.CARD
Consult


Consult Specialty:: Cardiology


Referred by:: Hospitalist


Reason for Consultation:: Chest pain





- History of Present Illness


Chief Complaint: chest pain


History of Present Illness: 


48 year old woman with a pmh HTN, HLD, smoking, HCV, HBV, IVDA, CAD s/p NSTEMI 8 /2017 s/p PCI with TEMO mLAD at [facility] admitted from [facility] rehab 

for c/o chest pain.


Pt seen and examined today in nad. states she has had the same L sided chest 

pain on and off since her PCI. states that is usually associated with nausea 

and vomiting and that she feels it is GERD vs MSK pain.


On review of records from [facility] she was seen last by Dr. Gabriella Jones 12 /6/18 and had a nuclear stress test 11/29/18 which showed normal perfusion, no 

ischemia, normal LVEF.


She states her current pain is the same as prior, L sided, sharp, non-radiating

, point tenderness that is reproducible on palpation and deep inspiration.





NST11/29/18


The calculated left ventricular ejection fraction is normal at


60% . In the stress SPECT images, tracer is uniformly distributed throughout


the heart, suggesting normal myocardial perfusion. In the resting images,


tracer distributed homogeneously matched with the stress images. There is


normal wall motion and thickening. There is no LV cavity dilatation. The


global LV wall motion is normal .





ECHO 5/17/18


Interpretation Summary


Normal left ventricular wall motion and ejection fraction.





- History Source


History Provided By: Patient, Medical Record


Limitations to Obtaining History: No Limitations





- Past Medical History


Cardio/Vascular: Yes: CAD, HTN, Hyperlipdemia


Gastrointestinal: Yes: GERD


...LMP: 05/05/12





- Alcohol/Substance Use


Hx Alcohol Use: Yes





- Smoking History


Smoking history: Unknown if ever smoked


Have you smoked in the past 12 months: Yes


Aproximately how many cigarettes per day: 10





- Social History


ADL: Independent


History of Recent Travel: No





Home Medications





- Allergies


Allergies/Adverse Reactions: 


 Allergies











Allergy/AdvReac Type Severity Reaction Status Date / Time


 


divalproex sodium Allergy Intermediate  Verified 09/24/19 09:37





[From Depakote]     


 


Sulfa (Sulfonamide Allergy Intermediate  Verified 09/24/19 09:37





Antibiotics)     


 


haloperidol [From Haldol] AdvReac Severe Difficulty Verified 09/24/19 09:37





   Breathing  














- Home Medications


Home Medications: 


Ambulatory Orders





Tenofovir Alafenamide Fumarate [Vemlidy] 300 mg PO DAILY 09/10/18 


Quetiapine Fumarate [Seroquel -] 200 mg PO BID 09/12/18 


Mirtazapine 15 mg PO DAILY 08/21/19 


Ondansetron [Zofran *Odt*] 4 mg SL TID PRN 08/21/19 


Albuterol Sulfate Inhaler - [Ventolin HFA Inhaler -] 2 puff IH Q4H PRN #1 

inhaler 08/26/19 


Aspirin [ASA -] 81 mg PO DAILY #30 tab.chew 08/26/19 


Clopidogrel Bisulfate [Plavix -] 75 mg PO DAILY #30 tablet 08/26/19 


Metoprolol Succinate [Toprol XL -] 25 mg PO DAILY #30 tab.sr.24h 08/26/19 


Pantoprazole Sodium [Protonix -] 40 mg PO DAILY@0600 #10 tablet.ec 08/26/19 


Quetiapine Fumarate [Seroquel -] 50 mg PO HS #30 tablet 08/26/19 


Ranitidine [Zantac -] 150 mg PO DAILY #60 tablet 08/26/19 


Enalapril Maleate [Vasotec -] 5 mg PO BID 09/19/19 


Atorvastatin Ca [Lipitor] 40 mg PO HS 09/20/19 


Mirtazapine [Remeron -] 15 mg PO HS 09/20/19 











Review of Systems





- Review of Systems


Constitutional: denies: No Symptoms, Chills, Diaphoresis, Fever, Lethargy, Loss 

of Appetite, Malaise, Night Sweats, Unintentional Wgt. Loss, Weakness, Other


Eyes: denies: No Symptoms, Blind Spots, Blurred Vision, Double Vision, Eye Pain

, Floaters, Photophobia, Recent Change in Vision, Other


HENT: denies: No Symptoms, Difficult Swallowing, Ear Discharge, Ear Pain, 

Epistaxis, Gingival Bleeding, Hearing Loss, Mouth Swelling, Nasal Congestion, 

Ocular Prosthesis, Throat Pain, Toothache, Ringing in Ears, Other


Neck: denies: No Symptoms, Decreased ROM, Lumps, Pain on Movement, Stiffness, 

Swollen Glands, Tenderness, Other


Cardiovascular: reports: Chest Pain.  denies: No Symptoms, Edema, Palpitations, 

Shortness of Breath, Other


Respiratory: denies: No Symptoms, Cough, Exercise Intolerance, Hemoptysis, 

Orthopnea, PND, Snoring, SOB, SOB on Exertion, Wheezing, Other


Gastrointestinal: denies: No Symptoms, Abdominal Pain, Bloating, Constipation, 

Diarrhea, Dysphagia, Indigestion, Melena, Nausea, Rectal Bleeding, Vomiting, 

Vomiting Blood, Other


Genitourinary: denies: No Symptoms, Burning, Discharge, Dysuria, Flank Pain, 

Frequency, Hematuria, Incontinence, Lesions, Menses, Pain, Testicular Mass, 

Testicular Pain, Testicular Swelling, Urgency, Vaginal Bleeding, Other


Breasts: denies: No Symptoms Reported, See HPI, Breast Implants, Discharge from 

Nipple, Lumps, Pain, Skin Changes, Other


Musculoskeletal: denies: No Symptoms, Back Pain, Crepitus, Decreased ROM, 

Extremity Pain, Joint Pain, Joint Swelling, Muscle Pain, Muscle Cramps, Muscle 

Weakness, Other


Integumentary: reports: No Symptoms


Neurological: reports: No Symptoms


Endocrine: reports: No Symptoms


Hematology/Lymphatic: reports: No Symptoms





- Risk Factors


Known Risk Factors: Yes: Hypercholesterolemia, Hypertension, Prior MI /Emb 

Stroke, Smoking


Vital Signs: 


 Vital Signs











Temperature  98.2 F   09/24/19 12:09


 


Pulse Rate  68   09/24/19 12:09


 


Respiratory Rate  18   09/24/19 12:09


 


Blood Pressure  144/94   09/24/19 12:09


 


O2 Sat by Pulse Oximetry (%)  100   09/24/19 08:59











Constitutional: Yes: No Distress, Calm


Eyes: Yes: Conjunctiva Clear, EOM Intact


HENT: Yes: Atraumatic, Normocephalic


Neck: Yes: Supple, Trachea Midline


Respiratory: Yes: Regular, CTA Bilaterally.  No: Rales, Rhonchi, SOB, Wheezes


Gastrointestinal: Yes: Normal Bowel Sounds, Soft.  No: Distention, Tenderness


JVD: No


Carotid Bruit: No


PMI: Non-Displaced


Heart Sounds: Yes: S1, S2.  No: Split S2, S3, S4, Clicks, Gallop, Rub, Bruit


Murmur: No: Systolic Murmur, Diastolic Murmur


Edema: No


Peripheral Pulses WNL: Yes


Neurological: Yes: Alert, Oriented


Psychiatric: Yes: Alert, Oriented





- Other Data


Labs, Other Data: 


 CBC, BMP





 09/24/19 10:10 





 09/24/19 10:10 





 INR, PTT











INR  1.00  (0.83-1.09)   09/24/19  10:10    








 Troponin, BNP











  09/24/19





  10:10


 


Troponin I  < 0.02


 


B-Natriuretic Peptide  615.8 H








 Troponin, BNP











  09/24/19





  10:10


 


Troponin I  < 0.02


 


B-Natriuretic Peptide  615.8 H











ekg-nsr T wave abnl consider inferior ischemia





Echo: Report Reviewed


Prior Cardiac Procedures: PTCA with Stent





Imaging





- Results


Chest X-ray: Report Reviewed, Image Reviewed


EKG: Report Reviewed, Image Reviewed


Other: Report Reviewed





Assessment/Plan


48 year old woman with a pmh HTN, HLD, smoking, HCV, HBV, IVDA, CAD s/p NSTEMI 8 /2017 s/p PCI with TEMO mLAD at [facility] admitted from [facility] rehab 

for c/o chest pain.


Pt seen and examined today in nad. states she has had the same L sided chest 

pain on and off since her PCI. states that is usually associated with nausea 

and vomiting and that she feels it is GERD vs MSK pain.


On review of records from [facility] she was seen last by Dr. Gabriella Jones 12 /6/18 and had a nuclear stress test 11/29/18 which showed normal perfusion, no 

ischemia, normal LVEF.


She states her current pain is the same as prior, L sided, sharp, non-radiating

, point tenderness that is reproducible on palpation and deep inspiration.





Chest pain-known h/o CAD NSTEMI and PCI LAD 2017, current chest pain, atypical, 

unlikely ACS


-pt reports same L sided chest pain on and off since her PCI


-point tenderness reproducible on exam


-nuclear stress test as below 11/29/18 showed no ischemia and normal LVEF


-echo 5/17/18 showed normal LVEF no sig valvular abnl


-cardiac enzymes wnl x 1


-ekg mildly abnl


-would check 2nd set of cardiac enzymes and if wnl pt would be acceptable for 

discharge from a cardiac standpoint with outpatient fup.


-no other inpatient cardiac testing is needed at this time.


-pt agrees to fup with her cardiologist within 1-2 weeks of discharge from [facility].


-cont current medical regimen





Will see as needed.


Please call with any additional questions.





NST11/29/18


The calculated left ventricular ejection fraction is normal at


60% . In the stress SPECT images, tracer is uniformly distributed throughout


the heart, suggesting normal myocardial perfusion. In the resting images,


tracer distributed homogeneously matched with the stress images. There is


normal wall motion and thickening. There is no LV cavity dilatation. The


global LV wall motion is normal .





ECHO 5/17/18


Interpretation Summary


Normal left ventricular wall motion and ejection fraction.

## 2019-09-24 NOTE — EKG
Test Reason : 

Blood Pressure : ***/*** mmHG

Vent. Rate : 063 BPM     Atrial Rate : 063 BPM

   P-R Int : 126 ms          QRS Dur : 084 ms

    QT Int : 462 ms       P-R-T Axes : 055 039 269 degrees

   QTc Int : 472 ms

 

NORMAL SINUS RHYTHM

T WAVE ABNORMALITY, CONSIDER INFERIOR ISCHEMIA

PROLONGED QT

ABNORMAL ECG

WHEN COMPARED WITH ECG OF 21-AUG-2019 11:52,

T WAVE INVERSION MORE EVIDENT IN INFERIOR LEADS

T WAVE INVERSION NOW EVIDENT IN LATERAL LEADS

Confirmed by MD Se, Kwame ([id number]) on 9/24/2019 12:08:41 PM

 

Referred By:             Confirmed By:Kwame Saez MD

## 2019-09-24 NOTE — HP
CHIEF COMPLAINT: chest pain





PCP:  at [facility]





HISTORY OF PRESENT ILLNESS:


Patient is a 47 y/o female with a history of CAD s/p stents (), CHF, HLD, 

HTN, Hep C ( cured with medication), Hepatitis B, and GERD who presents for 

chest pain. Patient has been at [facility] for rehab for heroin abuse. Patient 

reports she woke up this morning with chest pain and thought it was her GERD so 

she took her medication and the pain persisted. Patient reports she has been 

having this pain on and off for two years since her stent placed. Most recently 

she had the pain two days ago. She notes she has numbness and tingling in her 

arm during the pain episodes and feels sweaty. The pain is not associated with 

any movement or anything but comes and goes. Patient reports she often has 

chronic pain but is restricted on the medications she can take as she has liver 

disease. Patient denies nausea, vomiting, headache, dysuria, or shortness of 

breath. 





Patient relapsed and has been doing heroin for the last month. Just recently 

she started IV use. She has been using on and off since she was 21. 





Spoke with Dr. Christensen who was able to pull up her cardio records from 

[facility]. She had a stress test last November and it showed normal perfusion. 

Patient followed with her cardiologist in December without any changes to her 

medications. 





ER course was notable for:


(1) ekg with new twave inversions in V5 and V6


(2) 


(3)





Recent Travel: denies





PAST MEDICAL HISTORY: CAD s/p stents (2017), CHF, HLD, HTN, Hep C ( cured with 

medication), Hepatitis B, and GERD





PAST SURGICAL HISTORY:





Social History:


Smokin a day


Alcohol: denies


Drugs: heroin





Allergies





divalproex sodium [From Depakote] Allergy (Intermediate, Verified 19 09:37

)


 


Sulfa (Sulfonamide Antibiotics) Allergy (Intermediate, Verified 19 09:37)


 


haloperidol [From Haldol] Adverse Reaction (Severe, Verified 19 09:37)


 Difficulty Breathing








HOME MEDICATIONS:


 Home Medications











 Medication  Instructions  Recorded


 


Tenofovir Alafenamide Fumarate 300 mg PO DAILY 09/10/18





[Vemlidy]  


 


Quetiapine Fumarate [Seroquel -] 200 mg PO BID 18


 


Mirtazapine 15 mg PO DAILY 19


 


Ondansetron [Zofran *Odt*] 4 mg SL TID PRN 19


 


Albuterol Sulfate Inhaler - 2 puff IH Q4H PRN #1 inhaler 19





[Ventolin HFA Inhaler -]  


 


Aspirin [ASA -] 81 mg PO DAILY #30 tab.chew 19


 


Clopidogrel Bisulfate [Plavix -] 75 mg PO DAILY #30 tablet 19


 


Metoprolol Succinate [Toprol XL -] 25 mg PO DAILY #30 tab.sr.24h 19


 


Pantoprazole Sodium [Protonix -] 40 mg PO DAILY@0600 #10 tablet.ec 19


 


Quetiapine Fumarate [Seroquel -] 50 mg PO HS #30 tablet 19


 


Ranitidine [Zantac -] 150 mg PO DAILY #60 tablet 19


 


Enalapril Maleate [Vasotec -] 5 mg PO BID 19


 


Atorvastatin Ca [Lipitor] 40 mg PO HS 19


 


Mirtazapine [Remeron -] 15 mg PO HS 19








REVIEW OF SYSTEMS


CONSTITUTIONAL: 


Absent:  fever, chills, diaphoresis, generalized weakness, malaise, loss of 

appetite, weight change


HEENT: 


Absent:  rhinorrhea, nasal congestion, throat pain, throat swelling, difficulty 

swallowing, mouth swelling, ear pain, eye pain, visual changes


CARDIOVASCULAR:  chest pain


Absent: chest pain, syncope, palpitations, irregular heart rate, lightheadedness

, peripheral edema


RESPIRATORY: 


Absent: cough, shortness of breath, dyspnea with exertion, orthopnea, wheezing, 

stridor, hemoptysis


GASTROINTESTINAL:


Absent: abdominal pain, abdominal distension, nausea, vomiting, diarrhea, 

constipation, melena, hematochezia


GENITOURINARY: 


Absent: dysuria, frequency, urgency, hesitancy, hematuria, flank pain, genital 

pain


MUSCULOSKELETAL: 


Absent: myalgia, arthralgia, joint swelling, back pain, neck pain


SKIN: 


Absent: rash, itching, pallor


HEMATOLOGIC/IMMUNOLOGIC: 


Absent: easy bleeding, easy bruising, lymphadenopathy, frequent infections


ENDOCRINE:


Absent: unexplained weight gain, unexplained weight loss, heat intolerance, 

cold intolerance


NEUROLOGIC: 


Absent: headache, focal weakness or paresthesias, dizziness, unsteady gait, 

seizure, mental status changes, bladder or bowel incontinence


PSYCHIATRIC: 


Absent: anxiety, depression, suicidal or homicidal ideation, hallucinations.








PHYSICAL EXAMINATION


 Vital Signs - 24 hr











  19





  08:59 12:09


 


Temperature 98.5 F 98.2 F


 


Pulse Rate 61 


 


Pulse Rate [  68





Right Radial]  


 


Respiratory 16 18





Rate  


 


Blood Pressure 128/91 


 


Blood Pressure  144/94





[Left Arm]  


 


O2 Sat by Pulse 100 





Oximetry (%)  











GENERAL: Awake, alert, and fully oriented, in no acute distress.


HEAD: Normal with no signs of trauma.


EYES: Pupils equal, round and reactive to light, extraocular movements intact, 


EARS, NOSE, THROAT:  Moist mucous membranes.


LUNGS: Breath sounds equal, clear to auscultation bilaterally. No wheezes, and 

no crackles. No accessory muscle use.


HEART: Regular rate and rhythm, normal S1 and S2 without murmur, rub or gallop.


ABDOMEN: Soft, nontender, not distended, normoactive bowel sounds, 


MUSCULOSKELETAL: Normal range of motion at all joints. tenderness to upper 

thoraci region on palpation. No CVA tenderness.


UPPER EXTREMITIES: left arm antecubital track marks, no signs of infection


LOWER EXTREMITIES: 2+ pulses, warm, well-perfused. No calf tenderness. No 

peripheral edema. 


NEUROLOGICAL:  Cranial nerves II-XII intact. Normal speech. Normal gait.


SKIN: Warm, dry, normal turgor, no rashes or lesions noted, normal capillary 

refill. 


 CBC,CMP











WBC  7.0 K/mm3 (4.0-10.0)   19  10:10    


 


RBC  4.49 M/mm3 (3.60-5.2)   19  10:10    


 


Hgb  14.8 GM/dL (10.7-15.3)   19  10:10    


 


Hct  44.5 % (32.4-45.2)   19  10:10    


 


MCV  99.0 fl (80-96)  H  19  10:10    


 


MCH  32.8 pg (25.7-33.7)   19  10:10    


 


MCHC  33.2 g/dl (32.0-36.0)   19  10:10    


 


RDW  15.6 % (11.6-15.6)   19  10:10    


 


Plt Count  140 K/MM3 (134-434)   19  10:10    


 


MPV  9.3 fl (7.5-11.1)   19  10:10    


 


Absolute Neuts (auto)  4.5 K/mm3 (1.5-8.0)   19  10:10    


 


Neutrophils %  65.4 % (42.8-82.8)   19  10:10    


 


Lymphocytes %  26.2 % (8-40)   19  10:10    


 


Monocytes %  6.7 % (3.8-10.2)   19  10:10    


 


Eosinophils %  1.5 % (0-4.5)   19  10:10    


 


Basophils %  0.2 % (0-2.0)   19  10:10    


 


Nucleated RBC %  0 % (0-0)   19  10:10    


 


Sodium  139 mmol/L (136-145)   19  10:10    


 


Potassium  4.7 mmol/L (3.5-5.1)   19  10:10    


 


Chloride  105 mmol/L ()   19  10:10    


 


Carbon Dioxide  29 mmol/L (21-32)   19  10:10    


 


Anion Gap  5 MMOL/L (8-16)  L  19  10:10    


 


BUN  15.3 mg/dL (7-18)   19  10:10    


 


Creatinine  0.8 mg/dL (0.55-1.3)   19  10:10    


 


Est GFR (CKD-EPI)AfAm  101.04   19  10:10    


 


Est GFR (CKD-EPI)NonAf  87.18   19  10:10    


 


Random Glucose  82 mg/dL ()   19  10:10    


 


Calcium  9.0 mg/dL (8.5-10.1)   19  10:10    


 


Magnesium  2.5 mg/dL (1.8-2.4)  H  19  10:10    


 


Total Bilirubin  0.5 mg/dL (0.2-1)   19  10:10    


 


AST  70 U/L (15-37)  H  19  10:10    


 


ALT  91 U/L (13-61)  H  19  10:10    


 


Alkaline Phosphatase  115 U/L ()   19  10:10    


 


Creatine Kinase  87 U/L ()   19  10:10    


 


Troponin I  < 0.02 ng/ml (0.00-0.05)   19  10:10    


 


B-Natriuretic Peptide  615.8 pg/ml (5-125)  H  19  10:10    


 


Total Protein  7.0 g/dl (6.4-8.2)   19  10:10    


 


Albumin  3.7 g/dl (3.4-5.0)   19  10:10    























ASSESSMENT/PLAN:


Patient is a 47 y/o female with a history of CAD s/p stents (), CHF, HLD, 

HTN, Hep C ( cured with medication), Hepatitis B, and GERD who presents for 

chest pain.





#chest pain, r/o ACS


   - troponin negative x1, f/u second troponin


   - ekg with new V5 and V6 t wave inversions compared from August


   -  given, continue ASA 81





#CAD with stents


   - continue plavix and ASA


   - done at [facility] in 2017


   - stress test 2018 with normal perfusion


   - case discussed with Dr Christensen





#HTN 


   - continue enalapril and metoprolol





#heroin detox


   - consulted Dr Lopes


   - patient on symptomatic management


   - would like to be discharged back to [facility]


   - continue remeron and seroquel





#Hepatitis B and C


   - continue tenofovir for hepatitis B


   - hepatitis C resolved with treatment per patient


    - elevated transaminitis, one time tylenol ordered





FEN


   - sodium controlled diet





Dispo: monitor for second troponin and then can DC








Visit type





- Emergency Visit


Emergency Visit: Yes


ED Registration Date: 19


Care time: The patient presented to the Emergency Department on the above date 

and was hospitalized for further evaluation of their emergent condition.





- New Patient


This patient is new to me today: Yes


Date on this admission: 19





- Critical Care


Critical Care patient: No





ATTENDING PHYSICIAN STATEMENT





I saw and evaluated the patient.


I reviewed the resident's note and discussed the case with the resident.


I agree with the resident's findings and plan as documented.








SUBJECTIVE:








OBJECTIVE:








ASSESSMENT AND PLAN:

## 2019-09-24 NOTE — PN
Teaching Attending Note


Name of Resident: Gabriella Sheffield





ATTENDING PHYSICIAN STATEMENT





I saw and evaluated the patient.


I reviewed the resident's note and discussed the case with the resident.


I agree with the resident's findings and plan as documented.








SUBJECTIVE: CP resolved. No palpitations/SOB/cough/sputum/hemoptysis/fever/

chills.








OBJECTIVE: Afebrile, Hemodnamically stable.


 Last Vital Signs











Temp Pulse Resp BP Pulse Ox


 


 98.2 F   67   14   170/89   100 


 


 09/24/19 12:09  09/24/19 16:55  09/24/19 16:55  09/24/19 16:55  09/24/19 08:59








HEENT - Atraumatic, Normocephalic


Heart - S1, S2, RRR


Lungs - clear to auscultation


Abdomen - Soft non-tender. Bowel Sounds normal.


Extremities - no edema, no calf tenderness


Neuro - AAO x 3. Tone/Power normal all 4 extremities. 





 Laboratory Results - last 24 hr











  09/24/19 09/24/19 09/24/19





  10:10 10:10 10:10


 


WBC   7.0 


 


RBC   4.49 


 


Hgb   14.8 


 


Hct   44.5 


 


MCV   99.0 H 


 


MCH   32.8 


 


MCHC   33.2 


 


RDW   15.6 


 


Plt Count   140 


 


MPV   9.3 


 


Absolute Neuts (auto)   4.5 


 


Neutrophils %   65.4 


 


Lymphocytes %   26.2 


 


Monocytes %   6.7 


 


Eosinophils %   1.5 


 


Basophils %   0.2 


 


Nucleated RBC %   0 


 


PT with INR   


 


INR   


 


Sodium    139


 


Potassium    4.7


 


Chloride    105


 


Carbon Dioxide    29


 


Anion Gap    5 L


 


BUN    15.3


 


Creatinine    0.8


 


Est GFR (CKD-EPI)AfAm    101.04


 


Est GFR (CKD-EPI)NonAf    87.18


 


Random Glucose    82


 


Calcium    9.0


 


Magnesium    2.5 H


 


Total Bilirubin    0.5


 


AST    70 H


 


ALT    91 H


 


Alkaline Phosphatase    115


 


Creatine Kinase  87  


 


Troponin I  < 0.02  


 


B-Natriuretic Peptide  615.8 H  


 


Total Protein    7.0


 


Albumin    3.7


 


Urine HCG, Qual   














  09/24/19 09/24/19 09/24/19





  10:10 10:30 15:43


 


WBC   


 


RBC   


 


Hgb   


 


Hct   


 


MCV   


 


MCH   


 


MCHC   


 


RDW   


 


Plt Count   


 


MPV   


 


Absolute Neuts (auto)   


 


Neutrophils %   


 


Lymphocytes %   


 


Monocytes %   


 


Eosinophils %   


 


Basophils %   


 


Nucleated RBC %   


 


PT with INR  11.80  


 


INR  1.00  


 


Sodium   


 


Potassium   


 


Chloride   


 


Carbon Dioxide   


 


Anion Gap   


 


BUN   


 


Creatinine   


 


Est GFR (CKD-EPI)AfAm   


 


Est GFR (CKD-EPI)NonAf   


 


Random Glucose   


 


Calcium   


 


Magnesium   


 


Total Bilirubin   


 


AST   


 


ALT   


 


Alkaline Phosphatase   


 


Creatine Kinase   


 


Troponin I    < 0.02


 


B-Natriuretic Peptide   


 


Total Protein   


 


Albumin   


 


Urine HCG, Qual   Negative 








 


 Home Medications











 Medication  Instructions  Recorded


 


Tenofovir Alafenamide Fumarate 300 mg PO DAILY 09/10/18





[Vemlidy]  


 


Quetiapine Fumarate [Seroquel -] 200 mg PO BID 09/12/18


 


Ondansetron [Zofran *Odt*] 4 mg SL TID PRN 08/21/19


 


Albuterol Sulfate Inhaler - 2 puff IH Q4H PRN #1 inhaler 08/26/19





[Ventolin HFA Inhaler -]  


 


Aspirin [ASA -] 81 mg PO DAILY #30 tab.chew 08/26/19


 


Clopidogrel Bisulfate [Plavix -] 75 mg PO DAILY #30 tablet 08/26/19


 


Metoprolol Succinate [Toprol XL -] 25 mg PO DAILY #30 tab.sr.24h 08/26/19


 


Pantoprazole Sodium [Protonix -] 40 mg PO DAILY@0600 #10 tablet.ec 08/26/19


 


Quetiapine Fumarate [Seroquel -] 50 mg PO HS #30 tablet 08/26/19


 


Ranitidine [Zantac -] 150 mg PO DAILY #60 tablet 08/26/19


 


Enalapril Maleate [Vasotec -] 5 mg PO BID 09/19/19


 


Atorvastatin Ca [Lipitor] 40 mg PO HS 09/20/19


 


Mirtazapine [Remeron -] 15 mg PO HS 09/20/19

















ASSESSMENT AND PLAN:





48 year old female with history of CAD s/p NSTEMI 8/17 s/p PCI with TEMO to LAD, 

HLD, HTN, Hep C (s/p treatment), Hepatitis B (on Tenofivir), GERD, Heroin Use, 

presents from [facility] with chest pain wit associated arm discomfort/tingling 

and diaphoresis. 





1. Atypical CP in patient with Hx CAd s/p NSTEMI s/p TEMO to LAD 2017


Troponin neg x 2.


ECG - new anterolateral T wave changes


s/p NM stress 11/29/18 that showed normal myocardial perfusion.


Echo 5/18/19 - normal. 


Seen by Cardiology and cleared for discharge with Cardio out-patient follow up.


Continue Aspirin, Clopidogrel, BB, ACE-I, Statin.





2. HTN - continue Enalapril and Metoprolol.





3. Hepatitis B/C


s/p Rx for Hep C


On Tenofivir





4. IVDU/heroin use


At [facility] Detox


For discharge back to 





5. Depression/Anxiety - Continue remeron and seroquel





Medically Stable for discharge back to [facility] to continue Detox. Cardio out-

patient follow up.

## 2019-09-24 NOTE — PDOC
Attending Attestation





- Resident


Resident Name: Abdelrahman Self





- ED Attending Attestation


I have performed the following: I have examined & evaluated the patient, The 

case was reviewed & discussed with the resident, I agree w/resident's findings 

& plan





- HPI


HPI: 





09/24/19 10:38


48F with a PMH of CAD s/p stents, CHF, HLD, Hep C, GERD, opioid dependence who 

presents to the ER with complaints of left sided sharp CP, which woke her up at 

730a. she felt like it was reflux symptoms.


She states the CP gradually started and worsened. It was associated with nausea

, lightheadedness, palpitations, and shortness of breath. She admits to IVDA 1 

week ago and has had on/off fevers and chills for 2 days prior to the start of 

this chest pain.





- Physicial Exam


PE: 





09/24/19 10:43


Agree with the resident's HPI and PE as documented in the electronic medical 

record.


NAD, well appearing, EOMI, PERRL, MMM, nl conjunctiva, anicteric; neck supple. 

lungs clear, RRR, abdomen soft nontender. Back nontender. ORTIZ x4, no focal 

neuro deficits. No peripheral edema. normal color for ethnicity, WWP.








- Medical Decision Making





09/24/19 10:43


See HPI for details. Prior notes reviewed, including admissions, discharges and 

consultations. 


Vital signs reviewed, wnl. 


Vital Signs











Temp Pulse Resp BP Pulse Ox


 


 98.5 F   61   16   128/91   100 


 


 09/24/19 08:59  09/24/19 08:59  09/24/19 08:59  09/24/19 08:59  09/24/19 08:59








laboratory results and imaging reviewed, basic labs and lytes wnl, notable for 

neg trop


EKG with sinus rhythm, new EKG changes, TWI in lateral leads


heart score moderate risk  at 4, as documented


warrants admit for r/o ACS, tele, cards inpatient and medical management given 

new changes and typical sx. also prior MI requiring stent





09/24/19 12:24





09/24/19 12:25








**Heart Score/ECG Review





- History


History: Moderately suspicious





- Electrocardiogram


EKG: Non specific repolarization disturbance





- Age


Age: 45-65





- Risk Factors


Risk Factors Heart Score: Yes Hx Hypercholesterolemia


Based on the list above the patient has:: 1-2 risk factors





- Troponin


Troponin: </= normal limit





- Score


Heart Score - Total: 4


  ** #1


ECG reviewed & interpreted by me at: 09:00


General ECG Interpretation: Sinus Rhythm, Normal Rate, Normal Intervals


Compared to previous ECG there are: Changes noted





09/24/19 10:45


TWI in lateral leads, new changes

## 2019-09-24 NOTE — PN
[facility] Progress Note


Note: 





called by nursing  for pt  w/ elevated /94 P 65, then told by nursing  pt 

c/o CP  started this  morning , radiating to left  side of chest  and  into 

left shoulder . 


pt resting in bed comfortably , NAD  . 





Active Medications





Al Hydroxide/Mg Hydroxide (Mylanta Oral Suspension -)  30 ml PO Q6H PRN


   PRN Reason: DYSPEPSIA


   Last Admin: 09/24/19 07:20 Dose:  30 ml


Albuterol Sulfate (Ventolin Hfa Inhaler -)  2 puff IH Q4H PRN


   PRN Reason: SHORT OF BREATH/WHEEZING


Aspirin (Asa -)  81 mg PO DAILY [facility]


   Last Admin: 09/24/19 08:01 Dose:  81 mg


Atorvastatin Calcium (Lipitor -)  40 mg PO HS [facility]


   Last Admin: 09/23/19 21:07 Dose:  40 mg


Clopidogrel Bisulfate (Plavix -)  75 mg PO DAILY@0700 [facility]


   Last Admin: 09/24/19 06:44 Dose:  75 mg


Cyclobenzaprine HCl (Flexeril -)  10 mg PO TID PRN


   PRN Reason: MUSCLE SPASMS


   Last Admin: 09/23/19 21:12 Dose:  10 mg


Enalapril Maleate (Vasotec -)  20 mg PO BID [facility]


   Last Admin: 09/23/19 22:27 Dose:  20 mg


Eucalyptus/Menthol/Phenol/Sorbitol (Cepastat Lozenge -)  1 each MM Q4H PRN


   PRN Reason: SORE THROAT


Guaifenesin (Robitussin -)  10 ml PO Q6H PRN


   PRN Reason: COUGH


Hydroxyzine Pamoate (Vistaril -)  25 mg PO Q4H PRN


   PRN Reason: ANXIETY


   Last Admin: 09/23/19 21:07 Dose:  25 mg


Lidocaine (Lidoderm Patch -)  1 patch TP DAILY [facility]


   Last Admin: 09/23/19 12:30 Dose:  1 patch


Loperamide HCl (Imodium -)  4 mg PO Q6H PRN


   PRN Reason: DIARRHEA


Magnesium Citrate (Citroma -)  300 ml PO Q48H PRN


   PRN Reason: CONSTIPATION


Magnesium Hydroxide (Milk Of Magnesia -)  30 ml PO DAILY PRN


   PRN Reason: CONSTIPATION


Melatonin (Melatonin)  5 mg PO HS PRN


   PRN Reason: INSOMNIA


   Last Admin: 09/23/19 21:08 Dose:  5 mg


Metoprolol Succinate (Toprol Xl -)  25 mg PO DAILY [facility]


   Last Admin: 09/23/19 10:11 Dose:  25 mg


Mirtazapine (Remeron -)  15 mg PO [facility]


   Last Admin: 09/23/19 21:07 Dose:  15 mg


Miscellaneous (Lidoderm Patch Removal)  1 each MC DAILY@2200 [facility]


   Last Admin: 09/23/19 21:09 Dose:  1 each


Ondansetron HCl (Zofran Odt -)  4 mg SL Q8H PRN


   PRN Reason: NAUSEA AND/OR VOMITING


   Stop: 09/25/19 14:37


Prenatal Multivit/Folic Acid/Iron (Prenatal Vitamins (Sjr) -)  1 tab PO DAILY 

[facility]


   Last Admin: 09/23/19 10:11 Dose:  1 tab


Pseudoephedrine/Triprolidine (Actifed -)  1 combo PO TID PRN


   PRN Reason: NASAL CONGESTION


Quetiapine Fumarate (Seroquel -)  100 mg PO [facility]


   Last Admin: 09/23/19 21:07 Dose:  100 mg


Quetiapine Fumarate (Seroquel -)  50 mg PO DAILY [facility]


   Last Admin: 09/23/19 10:11 Dose:  50 mg


Ranitidine HCl (Zantac -)  150 mg PO BID [facility]


   Last Admin: 09/23/19 21:07 Dose:  150 mg


Thiamine HCl (Vitamin B1 -)  100 mg PO [facility]


   Last Admin: 09/23/19 21:08 Dose:  100 mg





 Vital Signs - 24 hr











  09/23/19 09/23/19 09/24/19





  10:00 20:40 00:30


 


Temperature   


 


Pulse Rate 69 59 L 


 


Respiratory   17





Rate   


 


Blood Pressure 126/82 144/85 














  09/24/19 09/24/19





  03:30 07:15


 


Temperature  97.4 F L


 


Pulse Rate  65


 


Respiratory 18 18





Rate  


 


Blood Pressure  148/94








 EKG ordered  - sinus bradycardia  56 , T-wave abnormality  consider  infero-

lateral  ischemia  . 


Aspirin ordered, pt sent to [facility] ED  for further evaluation & tx ,  report 

given  to Dr Barillas .

## 2019-09-24 NOTE — DS
Physical Exam: 


SUBJECTIVE: Patient seen and examined, pain improved with medication.








OBJECTIVE:





 Vital Signs











 Period  Temp  Pulse  Resp  BP Sys/Hernandez  Pulse Ox


 


 Last 24 Hr  98.2 F-98.5 F  61-68  16-18  128-144/91-94  100








PHYSICAL EXAM


GENERAL: Awake, alert, and fully oriented, in no acute distress.


HEAD: Normal with no signs of trauma.


EYES: Pupils equal, round and reactive to light, extraocular movements intact, 


EARS, NOSE, THROAT:  Moist mucous membranes.


LUNGS: Breath sounds equal, clear to auscultation bilaterally. No wheezes, and 

no crackles. No accessory muscle use.


HEART: Regular rate and rhythm, normal S1 and S2 without murmur, rub or gallop.


ABDOMEN: Soft, nontender, not distended, normoactive bowel sounds, 


MUSCULOSKELETAL: Normal range of motion at all joints. tenderness to upper 

thoraci region on palpation. No CVA tenderness.


UPPER EXTREMITIES: left arm antecubital track marks, no signs of infection


LOWER EXTREMITIES: 2+ pulses, warm, well-perfused. No calf tenderness. No 

peripheral edema. 


NEUROLOGICAL:  Cranial nerves II-XII intact. Normal speech. Normal gait.


SKIN: Warm, dry, normal turgor, no rashes or lesions noted, normal capillary 

refill. 








LABS


  CBC, BMP





 09/24/19 10:10 





 09/24/19 10:10 




















HOSPITAL COURSE:





Date of Admission:09/24/19





Patient is a 47 y/o female with a history of CAD s/p stents (2017), CHF, HLD, 

HTN, Hep C ( cured with medication), Hepatitis B, and GERD who presents for 

chest pain. Patient had two negative troponins. Cardio evaluated patient with a 

negative stress test within one year. Patient can continue cardiac workup as an 

out patient. Patient vitals stable for discharge.





ekg with new V5 and V6 t wave inversions compared from August





Date of Discharge: 09/24/19











Minutes to complete discharge: 35





Discharge Summary


Problems reviewed: Yes


Reason For Visit: HYPERTENSION


Current Active Problems





Chest pain due to CAD (Acute)


Alcohol dependence (Chronic)


Bipolar disorder (Chronic)


Cannabis dependence (Chronic)


Cocaine dependence (Chronic)


Insomnia (Chronic)


Nicotine dependence (Chronic)


Opioid dependence (Chronic)


Substance induced mood disorder (Chronic)








Condition: Good





- Instructions


Diet, Activity, Other Instructions: 


You were admitted to the hospital for chest pain. While you were here we 

monitored your heart and looked at your heart enzymes. At this time your heart 

is functioning normally and you are stable to be discharged back to [facility] 

to continue rehab.





Please make sure to follow up with your Cardiologist in 1-2 weeks. 





Please continue to take all of your home medications as prescribed. 





Follow up with your primary care physician within one week. 





Return to the Emergency Department if you have any nausea, vomiting, diarrhea, 

headache, shortness of breath, or worsening symptoms. 


Referrals: 


Gabriella Jones MD [Non Staff, Medical] - 1 Week


Disposition: I.P. ALCOHOL/SUBS ABUSE REHAB





- Home Medications


Comprehensive Discharge Medication List: 


Ambulatory Orders





Tenofovir Alafenamide Fumarate [Vemlidy] 300 mg PO DAILY 09/10/18 


Quetiapine Fumarate [Seroquel -] 200 mg PO BID 09/12/18 


Ondansetron [Zofran *Odt*] 4 mg SL TID PRN 08/21/19 


Albuterol Sulfate Inhaler - [Ventolin HFA Inhaler -] 2 puff IH Q4H PRN #1 

inhaler 08/26/19 


Aspirin [ASA -] 81 mg PO DAILY #30 tab.chew 08/26/19 


Clopidogrel Bisulfate [Plavix -] 75 mg PO DAILY #30 tablet 08/26/19 


Metoprolol Succinate [Toprol XL -] 25 mg PO DAILY #30 tab.sr.24h 08/26/19 


Pantoprazole Sodium [Protonix -] 40 mg PO DAILY@0600 #10 tablet.ec 08/26/19 


Quetiapine Fumarate [Seroquel -] 50 mg PO HS #30 tablet 08/26/19 


Ranitidine [Zantac -] 150 mg PO DAILY #60 tablet 08/26/19 


Enalapril Maleate [Vasotec -] 5 mg PO BID 09/19/19 


Atorvastatin Ca [Lipitor] 40 mg PO HS 09/20/19 


Mirtazapine [Remeron -] 15 mg PO HS 09/20/19 








This patient is new to me today: No


Emergency Visit: Yes


ED Registration Date: 09/24/19


Care time: The patient presented to the Emergency Department on the above date 

and was hospitalized for further evaluation of their emergent condition.


Critical Care patient: No





- Discharge Referral


Referred to [facility] No





ATTENDING PHYSICIAN STATEMENT





I saw and evaluated the patient.


I reviewed the resident's note and discussed the case with the resident.


I agree with the resident's findings and plan as documented.








SUBJECTIVE:








OBJECTIVE:








ASSESSMENT AND PLAN:
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
## 2019-09-25 RX ADMIN — HYDROXYZINE PAMOATE PRN MG: 25 CAPSULE ORAL at 18:12

## 2019-09-25 RX ADMIN — ATORVASTATIN CALCIUM SCH MG: 40 TABLET, FILM COATED ORAL at 22:04

## 2019-09-25 RX ADMIN — CLOPIDOGREL BISULFATE SCH MG: 75 TABLET, FILM COATED ORAL at 06:43

## 2019-09-25 RX ADMIN — Medication PRN MG: at 22:07

## 2019-09-25 RX ADMIN — QUETIAPINE FUMARATE SCH MG: 100 TABLET ORAL at 22:04

## 2019-09-25 RX ADMIN — METHOCARBAMOL TABLETS SCH MG: 750 TABLET, COATED ORAL at 22:09

## 2019-09-25 RX ADMIN — Medication SCH MG: at 22:04

## 2019-09-25 RX ADMIN — ASPIRIN 81 MG SCH MG: 81 TABLET ORAL at 09:33

## 2019-09-25 RX ADMIN — TENOFOVIR DISOPROXIL FUMARATE SCH MG: 300 TABLET, COATED ORAL at 23:18

## 2019-09-25 RX ADMIN — Medication SCH TAB: at 09:33

## 2019-09-25 RX ADMIN — RANITIDINE SCH MG: 150 TABLET ORAL at 22:04

## 2019-09-25 RX ADMIN — ENALAPRIL MALEATE SCH MG: 10 TABLET ORAL at 22:04

## 2019-09-25 RX ADMIN — CYCLOBENZAPRINE HYDROCHLORIDE PRN MG: 10 TABLET, FILM COATED ORAL at 09:35

## 2019-09-25 RX ADMIN — HYDROXYZINE PAMOATE PRN MG: 25 CAPSULE ORAL at 09:35

## 2019-09-25 RX ADMIN — ANALGESIC BALM SCH APPLIC: 1.74; 4.06 OINTMENT TOPICAL at 22:08

## 2019-09-25 RX ADMIN — MIRTAZAPINE SCH MG: 15 TABLET, FILM COATED ORAL at 22:04

## 2019-09-25 RX ADMIN — RANITIDINE SCH MG: 150 TABLET ORAL at 09:34

## 2019-09-25 RX ADMIN — GABAPENTIN SCH MG: 300 CAPSULE ORAL at 22:04

## 2019-09-25 RX ADMIN — METHOCARBAMOL TABLETS SCH MG: 750 TABLET, COATED ORAL at 18:09

## 2019-09-25 RX ADMIN — GABAPENTIN SCH MG: 300 CAPSULE ORAL at 18:09

## 2019-09-25 RX ADMIN — ENALAPRIL MALEATE SCH MG: 10 TABLET ORAL at 10:22

## 2019-09-25 RX ADMIN — LIDOCAINE SCH PATCH: 50 PATCH TOPICAL at 09:33

## 2019-09-25 RX ADMIN — Medication SCH EACH: at 22:08

## 2019-09-25 NOTE — PN
[facility] Progress Note (SOAP)


Subjective: 


Patient c/o back pain unrelieved with muscle relaxant and lidoderm patch. 

Chronic back pain. In addition, difficulty with sleep. Pt has PMHx of Hep B 

treated with tenofovir. 





Objective: 





09/25/19 15:03


 Home Medication List











 Medication  Instructions  Recorded  Confirmed  Type


 


Tenofovir Alafenamide Fumarate 300 mg PO DAILY 09/10/18 09/24/19 History





[Vemlidy]    


 


Quetiapine Fumarate [Seroquel -] 200 mg PO BID 09/12/18 09/24/19 History


 


Ondansetron [Zofran *Odt*] 4 mg SL TID PRN 08/21/19 09/24/19 History


 


Enalapril Maleate [Vasotec -] 5 mg PO BID 09/19/19 09/24/19 History


 


Atorvastatin Ca [Lipitor] 40 mg PO HS 09/20/19 09/24/19 History


 


Mirtazapine [Remeron -] 15 mg PO HS 09/20/19 09/24/19 History








 











09/25/19 15:04


P/E:


General: appears to be in discomfort


Heart: s1 s2 audible


Lungs: clear


BACK; spine aligned, lower  to palpation


MSK: full weight bearing, steady gait, ROM limited by pain


09/25/19 15:05





Assessment: 


Back pain


Hep B


09/25/19 15:06





Plan: 


Back pain: flexeril changed to roboxin; gabapentin added, allyn-francisco added. NSAID 

was not added, patient is on plavix. 


HEP B: Tenofovir started


Insomnia: melatonin increased to 10 mg, pt has seroquel 100 at HS. pain may be 

interfering with sleep and a decrease in pain my promote sleep.

## 2019-09-26 RX ADMIN — GABAPENTIN SCH MG: 300 CAPSULE ORAL at 06:56

## 2019-09-26 RX ADMIN — Medication SCH EACH: at 21:30

## 2019-09-26 RX ADMIN — GABAPENTIN SCH MG: 300 CAPSULE ORAL at 21:28

## 2019-09-26 RX ADMIN — QUETIAPINE FUMARATE SCH MG: 100 TABLET ORAL at 21:28

## 2019-09-26 RX ADMIN — LIDOCAINE SCH PATCH: 50 PATCH TOPICAL at 09:55

## 2019-09-26 RX ADMIN — ATORVASTATIN CALCIUM SCH MG: 40 TABLET, FILM COATED ORAL at 21:28

## 2019-09-26 RX ADMIN — METHOCARBAMOL TABLETS SCH MG: 750 TABLET, COATED ORAL at 21:28

## 2019-09-26 RX ADMIN — ANALGESIC BALM SCH APPLIC: 1.74; 4.06 OINTMENT TOPICAL at 21:30

## 2019-09-26 RX ADMIN — RANITIDINE SCH MG: 150 TABLET ORAL at 09:56

## 2019-09-26 RX ADMIN — METHOCARBAMOL TABLETS SCH MG: 750 TABLET, COATED ORAL at 06:56

## 2019-09-26 RX ADMIN — ASPIRIN 81 MG SCH MG: 81 TABLET ORAL at 09:56

## 2019-09-26 RX ADMIN — Medication SCH MG: at 21:28

## 2019-09-26 RX ADMIN — MIRTAZAPINE SCH MG: 15 TABLET, FILM COATED ORAL at 21:28

## 2019-09-26 RX ADMIN — GABAPENTIN SCH MG: 300 CAPSULE ORAL at 14:15

## 2019-09-26 RX ADMIN — RANITIDINE SCH MG: 150 TABLET ORAL at 21:28

## 2019-09-26 RX ADMIN — METHOCARBAMOL TABLETS SCH MG: 750 TABLET, COATED ORAL at 14:15

## 2019-09-26 RX ADMIN — ENALAPRIL MALEATE SCH MG: 10 TABLET ORAL at 21:28

## 2019-09-26 RX ADMIN — HYDROXYZINE PAMOATE PRN MG: 25 CAPSULE ORAL at 09:58

## 2019-09-26 RX ADMIN — CLOPIDOGREL BISULFATE SCH MG: 75 TABLET, FILM COATED ORAL at 06:56

## 2019-09-26 RX ADMIN — ENALAPRIL MALEATE SCH MG: 10 TABLET ORAL at 09:56

## 2019-09-26 RX ADMIN — TENOFOVIR DISOPROXIL FUMARATE SCH MG: 300 TABLET, COATED ORAL at 09:57

## 2019-09-26 RX ADMIN — Medication SCH TAB: at 09:56

## 2019-09-26 RX ADMIN — Medication PRN MG: at 21:29

## 2019-09-27 VITALS — TEMPERATURE: 97.5 F

## 2019-09-27 VITALS — SYSTOLIC BLOOD PRESSURE: 127 MMHG | HEART RATE: 88 BPM | DIASTOLIC BLOOD PRESSURE: 90 MMHG

## 2019-09-27 RX ADMIN — ASPIRIN 81 MG SCH MG: 81 TABLET ORAL at 09:13

## 2019-09-27 RX ADMIN — LIDOCAINE SCH PATCH: 50 PATCH TOPICAL at 09:13

## 2019-09-27 RX ADMIN — CLOPIDOGREL BISULFATE SCH MG: 75 TABLET, FILM COATED ORAL at 06:16

## 2019-09-27 RX ADMIN — Medication SCH TAB: at 09:13

## 2019-09-27 RX ADMIN — RANITIDINE SCH MG: 150 TABLET ORAL at 09:13

## 2019-09-27 RX ADMIN — METHOCARBAMOL TABLETS SCH MG: 750 TABLET, COATED ORAL at 06:16

## 2019-09-27 RX ADMIN — GABAPENTIN SCH MG: 300 CAPSULE ORAL at 06:16

## 2019-09-27 RX ADMIN — ENALAPRIL MALEATE SCH MG: 10 TABLET ORAL at 09:18

## 2019-09-27 RX ADMIN — TENOFOVIR DISOPROXIL FUMARATE SCH MG: 300 TABLET, COATED ORAL at 09:14

## 2019-09-27 NOTE — DS
[facility] Discharge Summary





- [facility] Discharge Summary


Admission Date: 09/20/19


Discharge Date: 09/27/19





- History


Present History: Opioid dependence





- Discharge Physical Exam


Vital Signs: 


 Vital Signs











Temperature  97.5 F L  09/27/19 07:31


 


Pulse Rate  88   09/27/19 09:34


 


Respiratory Rate  18   09/27/19 07:31


 


Blood Pressure  127/90   09/27/19 09:34


 


O2 Sat by Pulse Oximetry (%)      











Pertinent Admission Physical Exam Findings: 





ROS: denies opiod cravings, night sweats, chest pain and sob. 





PE





alert and oriented x 3


skin warm and dry


+perrla, eoms intact bl


car s1s2


resp cta bl


ext full rom, no tremors


amb ad tono








- Treatment


Discharge Condition: Discharge condition good


Hospital Course: 





Patient requested early discharge from rehab due to personal family matters. 

Patient states she "got all she was going to get from rehab" at this time. 

Patient attended all meetings and states she accomplished all rehab goals. 

Patient is medically stable and denies SI/HI. Patients states she will call 

aftercare site [place] center for addiction services once she is discharged to see 

if she can get earlier appointment as initial appointment scheduled for 10/7/19 

at 10am. Patient encouraged to attend group meetings to prevent relapse and 

follow up with pcp within one week of discharge. 


Ambulatory Orders





Tenofovir Alafenamide Fumarate [Vemlidy] 300 mg PO DAILY 09/10/18 


Quetiapine Fumarate [Seroquel -] 200 mg PO BID 09/12/18 


Ondansetron [Zofran *Odt*] 4 mg SL TID PRN 08/21/19 


Pantoprazole Sodium [Protonix -] 40 mg PO DAILY@0600 #10 tablet.ec 08/26/19 


Quetiapine Fumarate [Seroquel -] 50 mg PO HS #30 tablet 08/26/19 


Enalapril Maleate [Vasotec -] 5 mg PO BID 09/19/19 


Albuterol Sulfate Inhaler - [Ventolin HFA Inhaler -] 2 puff IH Q4H PRN #1 

inhaler 09/27/19 


Aspirin [ASA -] 81 mg PO DAILY #30 tab.chew 09/27/19 


Atorvastatin Ca [Lipitor] 40 mg PO HS #30 tablet 09/27/19 


Clopidogrel Bisulfate [Plavix -] 75 mg PO DAILY #30 tablet 09/27/19 


Enalapril Maleate [Vasotec -] 20 mg PO BID #60 tablet 09/27/19 


Gabapentin [Neurontin -] 300 mg PO TID #30 capsule 09/27/19 


Metoprolol Succinate [Toprol XL -] 25 mg PO DAILY #30 tab.sr.24h 09/27/19 


Mirtazapine [Remeron -] 15 mg PO HS #30 tablet 09/27/19 


Quetiapine Fumarate [Seroquel -] 50 mg PO DAILY #30 tablet 09/27/19 


Quetiapine Fumarate [Seroquel -] 100 mg PO HS #30 tablet 09/27/19 


Ranitidine [Zantac -] 150 mg PO BID #60 tablet 09/27/19 











- Medication


Discharge Medications: 


Ambulatory Orders





Tenofovir Alafenamide Fumarate [Vemlidy] 300 mg PO DAILY 09/10/18 


Quetiapine Fumarate [Seroquel -] 200 mg PO BID 09/12/18 


Ondansetron [Zofran *Odt*] 4 mg SL TID PRN 08/21/19 


Pantoprazole Sodium [Protonix -] 40 mg PO DAILY@0600 #10 tablet.ec 08/26/19 


Quetiapine Fumarate [Seroquel -] 50 mg PO HS #30 tablet 08/26/19 


Enalapril Maleate [Vasotec -] 5 mg PO BID 09/19/19 


Albuterol Sulfate Inhaler - [Ventolin HFA Inhaler -] 2 puff IH Q4H PRN #1 

inhaler 09/27/19 


Aspirin [ASA -] 81 mg PO DAILY #30 tab.chew 09/27/19 


Atorvastatin Ca [Lipitor] 40 mg PO HS #30 tablet 09/27/19 


Clopidogrel Bisulfate [Plavix -] 75 mg PO DAILY #30 tablet 09/27/19 


Enalapril Maleate [Vasotec -] 20 mg PO BID #60 tablet 09/27/19 


Gabapentin [Neurontin -] 300 mg PO TID #30 capsule 09/27/19 


Metoprolol Succinate [Toprol XL -] 25 mg PO DAILY #30 tab.sr.24h 09/27/19 


Mirtazapine [Remeron -] 15 mg PO HS #30 tablet 09/27/19 


Quetiapine Fumarate [Seroquel -] 50 mg PO DAILY #30 tablet 09/27/19 


Quetiapine Fumarate [Seroquel -] 100 mg PO HS #30 tablet 09/27/19 


Ranitidine [Zantac -] 150 mg PO BID #60 tablet 09/27/19 











- Medication-Assisted Treatment (MAT)


Medication-Assisted Treatment (MAT): No


MAT Follow-up Referral: 





[facility] addiction services, patient to has appointment 10/7/19 a 10am. 

Contact information provided to patient by counselor. 





- Discharge Instructions


Diet, activity, other medical instructions: 





Diet: low sodium, as tolerated





Activity: as tolerated





Other medical instructions:


follow up with pcp within one week of discharge





- Follow-up Referral


Minutes to complete discharge: 30





- AMA


Did Patient Leave Against Medical Advice: No

## 2019-09-27 NOTE — PN
[facility] Progress Note


Note: 





Patient is scheduled for discharge today. Scripts for 30 days supply of 

medications(Seroquel 50 mg/day & 100 mg/hs, Remeron 15 mg/hs) are 

electronically transmitted to [facility] at [address], [place] [address]